# Patient Record
Sex: MALE | Race: WHITE | NOT HISPANIC OR LATINO | Employment: FULL TIME | URBAN - METROPOLITAN AREA
[De-identification: names, ages, dates, MRNs, and addresses within clinical notes are randomized per-mention and may not be internally consistent; named-entity substitution may affect disease eponyms.]

---

## 2017-02-16 ENCOUNTER — TELEPHONE (OUTPATIENT)
Dept: ONCOLOGY | Facility: CLINIC | Age: 42
End: 2017-02-16

## 2017-02-16 NOTE — TELEPHONE ENCOUNTER
Oral Chemotherapy Monitoring Program    Primary Oncologist: Dr. Cho  Primary Oncology Clinic: [facility]  Cancer Diagnosis: GIST     Therapy History:  Start Date: October 2015     Drug Interaction Assessment: No new drug interactions    Lab Monitoring Plan  Monitoring plan:   C1D1+  CMP, Mg, Phos, CBC C2D1+ Call, CMP, Mg, Phos, CBC C3D1+ Call, CMP, Mg, Phos, CBC C4D1+ Call, CMP, Mg, Phos, CBC C5D1+ Call, CMP, Mg, Phos, CBC C6D1+ Call, CMP, Mg, Phos, CBC   C1D8+ CBC C2D8+   C3D8+   C4D8+   C5D8+   C6D8+     C1D15+ Call, CBC C2D15+ CBC C3D15+   C4D15+   C5D15+   C6D15+     C1D22+ CBC C2D22+   C3D22+   C4D22+   C5D22+   C6D22+         Subjective/Objective:  Devin Patton is a 41 year old male contacted by phone for a follow-up visit for oral chemotherapy. He reports everything has been going well and denies nausea, vomiting, constipation, diarrhea, rash, fatigue, muscle aches, and recent fever/illness. No recent medication changes, missed doses, or questions. MPR = 0.9, 11 fills in 12 months, patient is adherent. Last delivered 30 day supply on 2/10.     ORAL CHEMOTHERAPY 11/13/2015 12/28/2015 1/20/2016 3/11/2016 4/12/2016 10/14/2016 2/16/2017   Drug Name Gleevec (Imatinib) Gleevec (Imatinib) Gleevec (Imatinib) Gleevec (Imatinib) Gleevec (Imatinib) Gleevec (Imatinib) Gleevec (Imatinib)   Current Dosage 400mg 400mg 400mg 400mg 400mg 400mg 400mg   Current Schedule Daily Daily Daily Daily Daily Daily Daily   Cycle Details Continuous Continuous Continuous Continuous Continuous Continuous Continuous   Start Date of Last Cycle 10/18/2015 - - - - - -   Doses missed in last 2 weeks 2 0 0 0 0 0 0   Adherence Assessment - - - - - - Adherent   Adverse Effects - Other (see comments) Other (see comments) Diarrhea None Diarrhea;Fatigue No AE identified during assessment   Diarrhea - - - 1-2/day - Grade 1 -   Pharmacist Intervention(diarrhea) - - - - - No -   Fatigue - - - - - Grade 1 -   Pharmacist  "Intervention(fatigue) - - - - - No -   Home BPs not needed not needed not needed not needed not needed not needed not needed   Any new drug interactions? - - - - - - No   Is the dose as ordered appropriate for the patient? - - - - - - Yes   Has the patient missed any days of school, work, or other routine activity? - - - - - - No       Last PHQ-2 Score on record:   PHQ-2 ( 1999 Pfizer) 1/25/2016 10/30/2015   Q1: Little interest or pleasure in doing things 0 2   Q2: Feeling down, depressed or hopeless 0 2   PHQ-2 Score 0 4       Patient does not report depression symptoms.      Vitals:  BP:   BP Readings from Last 1 Encounters:   10/27/16 129/77     Wt Readings from Last 1 Encounters:   10/27/16 90.7 kg (199 lb 14.4 oz)     Estimated body surface area is 2.14 meters squared as calculated from the following:    Height as of 10/27/16: 1.825 m (5' 11.85\").    Weight as of 10/27/16: 90.7 kg (199 lb 14.4 oz).    Labs:  Lab Results   Component Value Date     10/14/2016      Lab Results   Component Value Date    POTASSIUM 4.5 10/14/2016     Lab Results   Component Value Date    NADEEM 8.6 10/14/2016     Lab Results   Component Value Date    ALBUMIN 3.6 10/14/2016     Lab Results   Component Value Date    MAG 2.1 10/14/2016     Lab Results   Component Value Date    PHOS 2.0 10/14/2016     Lab Results   Component Value Date    BUN 14 10/14/2016     Lab Results   Component Value Date    CR 0.86 10/14/2016       Lab Results   Component Value Date    AST 17 10/14/2016     Lab Results   Component Value Date    ALT 27 10/14/2016     Lab Results   Component Value Date    BILITOTAL 0.4 10/14/2016       Lab Results   Component Value Date    WBC 6.1 10/14/2016     Lab Results   Component Value Date    HGB 15.4 10/14/2016     Lab Results   Component Value Date     10/14/2016     01/04/2016     Lab Results   Component Value Date    ANEU 3.7 10/14/2016           Assessment:  Patient is tolerating the medication well and " is satisfied with therapy. No pharmacological interventions needed at this time.     Plan:  Continue Gleevec 400 mg once daily.  Follow-Up:  Follow up in 3 months for side effect management.  Dr. Cho appointment, labs, radiology - 5/1/17    Refill Due:  Next rx released 2/20/17    Abebe Miller, PharmD  Therapy Management Oncology Pharmacist  [facility]   Phone: [phone]

## 2017-02-20 ENCOUNTER — TELEPHONE (OUTPATIENT)
Dept: ONCOLOGY | Facility: CLINIC | Age: 42
End: 2017-02-20

## 2017-02-20 DIAGNOSIS — R50.9 FEVER, UNSPECIFIED: Primary | ICD-10-CM

## 2017-02-20 NOTE — TELEPHONE ENCOUNTER
Chucho called in with a temp of 100.6, a sore throat, and slight congestion. He is slightly short of breath. No other symptoms.  Writer spoke with Dr. Cho and advised he take APAP and have labs checked tomorrow (CBC order entered).  Chucho will seek care of develops green sputum or increased shortness of breath. He will get labs drawn at [facility] tomorrow.    Lisa Hickey RN

## 2017-02-21 ENCOUNTER — TELEPHONE (OUTPATIENT)
Dept: ONCOLOGY | Facility: CLINIC | Age: 42
End: 2017-02-21

## 2017-02-21 ENCOUNTER — HOSPITAL ENCOUNTER (OUTPATIENT)
Dept: LAB | Facility: CLINIC | Age: 42
Discharge: HOME | End: 2017-02-21
Attending: INTERNAL MEDICINE | Admitting: INTERNAL MEDICINE
Payer: COMMERCIAL

## 2017-02-21 DIAGNOSIS — E83.39 HYPOPHOSPHATEMIA: Primary | ICD-10-CM

## 2017-02-21 DIAGNOSIS — Z79.899 ENCOUNTER FOR LONG-TERM (CURRENT) USE OF MEDICATIONS: ICD-10-CM

## 2017-02-21 LAB
ALBUMIN SERPL-MCNC: 3.6 G/DL (ref 3.4–5)
ALP SERPL-CCNC: 80 U/L (ref 40–150)
ALT SERPL W P-5'-P-CCNC: 29 U/L (ref 0–70)
ANION GAP SERPL CALCULATED.3IONS-SCNC: 4 MMOL/L (ref 3–14)
AST SERPL W P-5'-P-CCNC: 29 U/L (ref 0–45)
BASOPHILS # BLD AUTO: 0 10E9/L (ref 0–0.2)
BASOPHILS NFR BLD AUTO: 0.3 %
BILIRUB SERPL-MCNC: 0.4 MG/DL (ref 0.2–1.3)
BUN SERPL-MCNC: 14 MG/DL (ref 7–30)
CALCIUM SERPL-MCNC: 8.1 MG/DL (ref 8.5–10.1)
CHLORIDE SERPL-SCNC: 109 MMOL/L (ref 94–109)
CO2 SERPL-SCNC: 27 MMOL/L (ref 20–32)
CREAT SERPL-MCNC: 0.92 MG/DL (ref 0.66–1.25)
DIFFERENTIAL METHOD BLD: ABNORMAL
EOSINOPHIL # BLD AUTO: 0 10E9/L (ref 0–0.7)
EOSINOPHIL NFR BLD AUTO: 1.1 %
ERYTHROCYTE [DISTWIDTH] IN BLOOD BY AUTOMATED COUNT: 13.9 % (ref 10–15)
GFR SERPL CREATININE-BSD FRML MDRD: ABNORMAL ML/MIN/1.7M2
GLUCOSE SERPL-MCNC: 103 MG/DL (ref 70–99)
HCT VFR BLD AUTO: 44.6 % (ref 40–53)
HGB BLD-MCNC: 15.5 G/DL (ref 13.3–17.7)
IMM GRANULOCYTES # BLD: 0 10E9/L (ref 0–0.4)
IMM GRANULOCYTES NFR BLD: 0 %
LYMPHOCYTES # BLD AUTO: 1.2 10E9/L (ref 0.8–5.3)
LYMPHOCYTES NFR BLD AUTO: 34.5 %
MAGNESIUM SERPL-MCNC: 2.1 MG/DL (ref 1.6–2.3)
MCH RBC QN AUTO: 32.2 PG (ref 26.5–33)
MCHC RBC AUTO-ENTMCNC: 34.8 G/DL (ref 31.5–36.5)
MCV RBC AUTO: 93 FL (ref 78–100)
MONOCYTES # BLD AUTO: 0.5 10E9/L (ref 0–1.3)
MONOCYTES NFR BLD AUTO: 14.9 %
NEUTROPHILS # BLD AUTO: 1.7 10E9/L (ref 1.6–8.3)
NEUTROPHILS NFR BLD AUTO: 49.2 %
NRBC # BLD AUTO: 0 10*3/UL
NRBC BLD AUTO-RTO: 0 /100
PHOSPHATE SERPL-MCNC: 1.3 MG/DL (ref 2.5–4.5)
PLATELET # BLD AUTO: 149 10E9/L (ref 150–450)
POTASSIUM SERPL-SCNC: 4.5 MMOL/L (ref 3.4–5.3)
PROT SERPL-MCNC: 6.8 G/DL (ref 6.8–8.8)
RBC # BLD AUTO: 4.81 10E12/L (ref 4.4–5.9)
SODIUM SERPL-SCNC: 140 MMOL/L (ref 133–144)
WBC # BLD AUTO: 3.5 10E9/L (ref 4–11)

## 2017-02-21 PROCEDURE — 84100 ASSAY OF PHOSPHORUS: CPT | Performed by: INTERNAL MEDICINE

## 2017-02-21 PROCEDURE — 83735 ASSAY OF MAGNESIUM: CPT | Performed by: INTERNAL MEDICINE

## 2017-02-21 PROCEDURE — 36415 COLL VENOUS BLD VENIPUNCTURE: CPT | Performed by: INTERNAL MEDICINE

## 2017-02-21 PROCEDURE — 80053 COMPREHEN METABOLIC PANEL: CPT | Performed by: INTERNAL MEDICINE

## 2017-02-21 PROCEDURE — 85025 COMPLETE CBC W/AUTO DIFF WBC: CPT | Performed by: INTERNAL MEDICINE

## 2017-02-21 NOTE — TELEPHONE ENCOUNTER
Pt called for his lab results this morning. He was quite concerned about his low phosphorus and his WBC of 3.5. I let him know his ANC is WNL. Pt on Gleevec. His labs released to [facility]. Per Betzy pt to start on neutra phos 500 mg three times daily and have labs rechecked in 2 weeks.  Pt voices good understanding. Med sent to his preferred provider.

## 2017-02-28 DIAGNOSIS — C49.A2 MALIGNANT GASTROINTESTINAL STROMAL TUMOR (GIST) OF STOMACH (H): Primary | ICD-10-CM

## 2017-02-28 RX ORDER — IMATINIB MESYLATE 400 MG/1
400 TABLET, FILM COATED ORAL DAILY
Qty: 30 TABLET | Refills: 2 | Status: SHIPPED | OUTPATIENT
Start: 2017-02-28 | End: 2017-05-01

## 2017-03-01 ENCOUNTER — TELEPHONE (OUTPATIENT)
Dept: ONCOLOGY | Facility: CLINIC | Age: 42
End: 2017-03-01

## 2017-03-01 NOTE — TELEPHONE ENCOUNTER
Oral Chemotherapy Monitoring Program    Primary Oncologist: Dr. Cho  Primary Oncology Clinic: [facility]  Cancer Diagnosis: GIST      Therapy History: Confirmed patient takes Gleevec 400 mg once daily, continuous   Start Date: October 2015      Drug Interaction Assessment: No new drug interactions    Lab Monitoring Plan  Monitoring plan:   C1D1+  CMP, Mg, Phos, CBC C2D1+ Call, CMP, Mg, Phos, CBC C3D1+ Call, CMP, Mg, Phos, CBC C4D1+ Call, CMP, Mg, Phos, CBC C5D1+ Call, CMP, Mg, Phos, CBC C6D1+ Call, CMP, Mg, Phos, CBC   C1D8+ CBC C2D8+    C3D8+    C4D8+    C5D8+    C6D8+      C1D15+ Call, CBC C2D15+ CBC C3D15+    C4D15+    C5D15+    C6D15+      C1D22+ CBC C2D22+    C3D22+    C4D22+    C5D22+    C6D22+           Subjective/Objective:  Devin Patton is a 41 year old male contacted by phone for a follow-up visit for oral chemotherapy.  Chucho reports he had a fever of 101F on 2/20 that broke on 2/21. His labs on 2/21/17 had lower WBC and phosphorus than on 10/14/16. WBC went from 6.1 to 3.5 and phosphorus went from 2 to 1.3. He will have follow-up labs next week. Started neutra phos 500 mg three times daily. He denies other side effects. Delivery set for 3/3.     ORAL CHEMOTHERAPY 12/28/2015 1/20/2016 3/11/2016 4/12/2016 10/14/2016 2/16/2017 3/1/2017   Drug Name Gleevec (Imatinib) Gleevec (Imatinib) Gleevec (Imatinib) Gleevec (Imatinib) Gleevec (Imatinib) Gleevec (Imatinib) Gleevec (Imatinib)   Current Dosage 400mg 400mg 400mg 400mg 400mg 400mg 400mg   Current Schedule Daily Daily Daily Daily Daily Daily Daily   Cycle Details Continuous Continuous Continuous Continuous Continuous Continuous Continuous   Start Date of Last Cycle - - - - - - -   Doses missed in last 2 weeks 0 0 0 0 0 0 0   Adherence Assessment - - - - - Adherent Adherent   Adverse Effects Other (see comments) Other (see comments) Diarrhea None Diarrhea;Fatigue No AE identified during assessment No AE identified during assessment   Diarrhea  "- - 1-2/day - Grade 1 - -   Pharmacist Intervention(diarrhea) - - - - No - -   Fatigue - - - - Grade 1 - -   Pharmacist Intervention(fatigue) - - - - No - -   Home BPs not needed not needed not needed not needed not needed not needed not needed   Any new drug interactions? - - - - - No No   Is the dose as ordered appropriate for the patient? - - - - - Yes Yes   Is the patient currently in pain? - - - - - - No   Has the patient missed any days of school, work, or other routine activity? - - - - - No No       Last PHQ-2 Score on record:   PHQ-2 ( 1999 Pfizer) 1/25/2016 10/30/2015   Q1: Little interest or pleasure in doing things 0 2   Q2: Feeling down, depressed or hopeless 0 2   PHQ-2 Score 0 4       Patient does not report depression symptoms.      Vitals:  BP:   BP Readings from Last 1 Encounters:   10/27/16 129/77     Wt Readings from Last 1 Encounters:   10/27/16 90.7 kg (199 lb 14.4 oz)     Estimated body surface area is 2.14 meters squared as calculated from the following:    Height as of 10/27/16: 1.825 m (5' 11.85\").    Weight as of 10/27/16: 90.7 kg (199 lb 14.4 oz).    Labs:  Lab Results   Component Value Date     02/21/2017      Lab Results   Component Value Date    POTASSIUM 4.5 02/21/2017     Lab Results   Component Value Date    NADEEM 8.1 02/21/2017     Lab Results   Component Value Date    ALBUMIN 3.6 02/21/2017     Lab Results   Component Value Date    MAG 2.1 02/21/2017     Lab Results   Component Value Date    PHOS 1.3 02/21/2017     Lab Results   Component Value Date    BUN 14 02/21/2017     Lab Results   Component Value Date    CR 0.92 02/21/2017       Lab Results   Component Value Date    AST 29 02/21/2017     Lab Results   Component Value Date    ALT 29 02/21/2017     Lab Results   Component Value Date    BILITOTAL 0.4 02/21/2017       Lab Results   Component Value Date    WBC 3.5 02/21/2017     Lab Results   Component Value Date    HGB 15.5 02/21/2017     Lab Results   Component Value Date "     02/21/2017     01/04/2016     Lab Results   Component Value Date    ANEU 1.7 02/21/2017       Labs   10/14/16  2/21/17  WBC   6.1   3.5  Phosphorus  2   1.3        Assessment:  Patient is tolerating the medication. Due to drop in WBC and phosphorus, will recheck labs next week. Started neutra phos 500mg three times daily on 2/21. No other pharmacological interventions at this time.      Plan:  Continue Gleevec 400 mg once daily.  Follow-Up:  Labs week of 3/6  Follow up in 3 months for side effect management.  Dr. Cho appointment, labs, radiology - 5/1/17    Refill Due:  Rx will be delivered 3/3/17  Next refill due in 4 weeks  Next Rx released 5/22/17    Abebe Miller, PharmD  Therapy Management Oncology Pharmacist  [facility]   Phone: [phone]

## 2017-03-08 ENCOUNTER — HOSPITAL ENCOUNTER (OUTPATIENT)
Dept: LAB | Facility: CLINIC | Age: 42
Discharge: HOME | End: 2017-03-08
Attending: INTERNAL MEDICINE | Admitting: INTERNAL MEDICINE
Payer: COMMERCIAL

## 2017-03-08 DIAGNOSIS — C49.9 SARCOMA (H): ICD-10-CM

## 2017-03-08 DIAGNOSIS — R50.9 FEVER, UNSPECIFIED: ICD-10-CM

## 2017-03-08 LAB
BASOPHILS # BLD AUTO: 0 10E9/L (ref 0–0.2)
BASOPHILS NFR BLD AUTO: 0.2 %
DIFFERENTIAL METHOD BLD: NORMAL
EOSINOPHIL # BLD AUTO: 0.1 10E9/L (ref 0–0.7)
EOSINOPHIL NFR BLD AUTO: 1.5 %
ERYTHROCYTE [DISTWIDTH] IN BLOOD BY AUTOMATED COUNT: 14.2 % (ref 10–15)
HCT VFR BLD AUTO: 43.2 % (ref 40–53)
HGB BLD-MCNC: 15 G/DL (ref 13.3–17.7)
IMM GRANULOCYTES # BLD: 0 10E9/L (ref 0–0.4)
IMM GRANULOCYTES NFR BLD: 0.3 %
LYMPHOCYTES # BLD AUTO: 1.5 10E9/L (ref 0.8–5.3)
LYMPHOCYTES NFR BLD AUTO: 24.5 %
MCH RBC QN AUTO: 32.6 PG (ref 26.5–33)
MCHC RBC AUTO-ENTMCNC: 34.7 G/DL (ref 31.5–36.5)
MCV RBC AUTO: 94 FL (ref 78–100)
MONOCYTES # BLD AUTO: 0.3 10E9/L (ref 0–1.3)
MONOCYTES NFR BLD AUTO: 4.4 %
NEUTROPHILS # BLD AUTO: 4.3 10E9/L (ref 1.6–8.3)
NEUTROPHILS NFR BLD AUTO: 69.1 %
NRBC # BLD AUTO: 0 10*3/UL
NRBC BLD AUTO-RTO: 0 /100
PHOSPHATE SERPL-MCNC: 1.9 MG/DL (ref 2.5–4.5)
PLATELET # BLD AUTO: 194 10E9/L (ref 150–450)
RBC # BLD AUTO: 4.6 10E12/L (ref 4.4–5.9)
WBC # BLD AUTO: 6.2 10E9/L (ref 4–11)

## 2017-03-08 PROCEDURE — 85025 COMPLETE CBC W/AUTO DIFF WBC: CPT | Performed by: INTERNAL MEDICINE

## 2017-03-08 PROCEDURE — 36415 COLL VENOUS BLD VENIPUNCTURE: CPT | Performed by: INTERNAL MEDICINE

## 2017-03-08 PROCEDURE — 84100 ASSAY OF PHOSPHORUS: CPT | Performed by: INTERNAL MEDICINE

## 2017-03-24 ENCOUNTER — TELEPHONE (OUTPATIENT)
Dept: ONCOLOGY | Facility: CLINIC | Age: 42
End: 2017-03-24

## 2017-03-24 NOTE — TELEPHONE ENCOUNTER
Oral Chemotherapy Monitoring Program    Primary Oncologist: Dr. Cho  Primary Oncology Clinic: [facility]  Cancer Diagnosis: GIST      Therapy History: Confirmed patient takes Gleevec 400 mg once daily, continuous   Start Date: October 2015      Drug Interaction Assessment: No new drug interactions     Lab Monitoring Plan                Monitoring plan:   C1D1+  CMP, Mg, Phos, CBC C2D1+ Call, CMP, Mg, Phos, CBC C3D1+ Call, CMP, Mg, Phos, CBC C4D1+ Call, CMP, Mg, Phos, CBC C5D1+ Call, CMP, Mg, Phos, CBC C6D1+ Call, CMP, Mg, Phos, CBC   C1D8+ CBC C2D8+    C3D8+    C4D8+    C5D8+    C6D8+      C1D15+ Call, CBC C2D15+ CBC C3D15+    C4D15+    C5D15+    C6D15+      C1D22+ CBC C2D22+    C3D22+    C4D22+    C5D22+    C6D22+             Subjective/Objective:  Devin Patton is a 41 year old male contacted by phone for a follow-up visit for oral chemotherapy.  Chucho reports he has been tolerating Gleevec well with no side effects. He was glad his WBC returned to WNL and his phos level increased. He does not have any questions or concerns today and appreciated the phone call.     ORAL CHEMOTHERAPY 1/20/2016 3/11/2016 4/12/2016 10/14/2016 2/16/2017 3/1/2017 3/24/2017   Drug Name Gleevec (Imatinib) Gleevec (Imatinib) Gleevec (Imatinib) Gleevec (Imatinib) Gleevec (Imatinib) Gleevec (Imatinib) Gleevec (Imatinib)   Current Dosage 400mg 400mg 400mg 400mg 400mg 400mg 400mg   Current Schedule Daily Daily Daily Daily Daily Daily Daily   Cycle Details Continuous Continuous Continuous Continuous Continuous Continuous Continuous   Start Date of Last Cycle - - - - - - -   Doses missed in last 2 weeks 0 0 0 0 0 0 0   Adherence Assessment - - - - Adherent Adherent Adherent   Adverse Effects Other (see comments) Diarrhea None Diarrhea;Fatigue No AE identified during assessment No AE identified during assessment No AE identified during assessment   Diarrhea - 1-2/day - Grade 1 - - -   Pharmacist Intervention(diarrhea) - - - No -  "- -   Fatigue - - - Grade 1 - - -   Pharmacist Intervention(fatigue) - - - No - - -   Home BPs not needed not needed not needed not needed not needed not needed not needed   Any new drug interactions? - - - - No No No   Is the dose as ordered appropriate for the patient? - - - - Yes Yes Yes   Is the patient currently in pain? - - - - - No No   Has the patient missed any days of school, work, or other routine activity? - - - - No No No       Last PHQ-2 Score on record:   PHQ-2 ( 1999 Pfizer) 1/25/2016 10/30/2015   Q1: Little interest or pleasure in doing things 0 2   Q2: Feeling down, depressed or hopeless 0 2   PHQ-2 Score 0 4       Patient does not report depression symptoms.      Vitals:  BP:   BP Readings from Last 1 Encounters:   10/27/16 129/77     Wt Readings from Last 1 Encounters:   10/27/16 90.7 kg (199 lb 14.4 oz)     Estimated body surface area is 2.14 meters squared as calculated from the following:    Height as of 10/27/16: 1.825 m (5' 11.85\").    Weight as of 10/27/16: 90.7 kg (199 lb 14.4 oz).    Labs:  Lab Results   Component Value Date     02/21/2017      Lab Results   Component Value Date    POTASSIUM 4.5 02/21/2017     Lab Results   Component Value Date    NADEEM 8.1 02/21/2017     Lab Results   Component Value Date    ALBUMIN 3.6 02/21/2017     Lab Results   Component Value Date    MAG 2.1 02/21/2017     Lab Results   Component Value Date    PHOS 1.9 03/08/2017     Lab Results   Component Value Date    BUN 14 02/21/2017     Lab Results   Component Value Date    CR 0.92 02/21/2017       Lab Results   Component Value Date    AST 29 02/21/2017     Lab Results   Component Value Date    ALT 29 02/21/2017     Lab Results   Component Value Date    BILITOTAL 0.4 02/21/2017       Lab Results   Component Value Date    WBC 6.2 03/08/2017     Lab Results   Component Value Date    HGB 15.0 03/08/2017     Lab Results   Component Value Date     03/08/2017     Lab Results   Component Value Date    ANEU " 4.3 03/08/2017           Assessment:    Patient is tolerating the medication. No pharmacological interventions at this time.        Plan:    Continue Gleevec 400 mg once daily.    Follow-Up:  Dr. Cho appointment, labs, radiology - 5/1/17     Refill Due:  Rx will be delivered 3/30/17  Next refill due in 4 weeks  Next Rx released 5/22/17

## 2017-04-26 ENCOUNTER — TELEPHONE (OUTPATIENT)
Dept: ONCOLOGY | Facility: CLINIC | Age: 42
End: 2017-04-26

## 2017-04-26 NOTE — ORAL ONC MGMT
Oral Chemotherapy Monitoring Program     Chucho called with question regarding potential risk in pregnancy of fathering a child while on Gleevec. It is recommended that highly effective contraception be used to avoid pregnancy while on Gleevec and for two weeks after last Gleevec dose. Risk of abnormalities are increased with paternal exposure. Chucho expressed understanding and thanked me for the information.     Shreyas WashburnD  [facility]  [phone]  April 26, 2017

## 2017-05-01 ENCOUNTER — ONCOLOGY VISIT (OUTPATIENT)
Dept: ONCOLOGY | Facility: CLINIC | Age: 42
End: 2017-05-01
Attending: INTERNAL MEDICINE
Payer: COMMERCIAL

## 2017-05-01 ENCOUNTER — APPOINTMENT (OUTPATIENT)
Dept: LAB | Facility: CLINIC | Age: 42
End: 2017-05-01
Attending: INTERNAL MEDICINE
Payer: COMMERCIAL

## 2017-05-01 VITALS
BODY MASS INDEX: 27.63 KG/M2 | SYSTOLIC BLOOD PRESSURE: 123 MMHG | WEIGHT: 204 LBS | OXYGEN SATURATION: 99 % | HEART RATE: 60 BPM | DIASTOLIC BLOOD PRESSURE: 52 MMHG | HEIGHT: 72 IN

## 2017-05-01 DIAGNOSIS — C49.A0 MALIGNANT GASTROINTESTINAL STROMAL TUMOR, UNSPECIFIED SITE (H): ICD-10-CM

## 2017-05-01 LAB
ALBUMIN SERPL-MCNC: 3.4 G/DL (ref 3.4–5)
ALP SERPL-CCNC: 58 U/L (ref 40–150)
ALT SERPL W P-5'-P-CCNC: 25 U/L (ref 0–70)
ANION GAP SERPL CALCULATED.3IONS-SCNC: 10 MMOL/L (ref 3–14)
AST SERPL W P-5'-P-CCNC: 22 U/L (ref 0–45)
BASOPHILS # BLD AUTO: 0 10E9/L (ref 0–0.2)
BASOPHILS NFR BLD AUTO: 0.4 %
BILIRUB SERPL-MCNC: 0.8 MG/DL (ref 0.2–1.3)
BUN SERPL-MCNC: 19 MG/DL (ref 7–30)
CALCIUM SERPL-MCNC: 7.3 MG/DL (ref 8.5–10.1)
CHLORIDE SERPL-SCNC: 112 MMOL/L (ref 94–109)
CO2 SERPL-SCNC: 23 MMOL/L (ref 20–32)
CREAT SERPL-MCNC: 0.76 MG/DL (ref 0.66–1.25)
DIFFERENTIAL METHOD BLD: NORMAL
EOSINOPHIL # BLD AUTO: 0.1 10E9/L (ref 0–0.7)
EOSINOPHIL NFR BLD AUTO: 1.5 %
ERYTHROCYTE [DISTWIDTH] IN BLOOD BY AUTOMATED COUNT: 13.5 % (ref 10–15)
GFR SERPL CREATININE-BSD FRML MDRD: ABNORMAL ML/MIN/1.7M2
GLUCOSE SERPL-MCNC: 90 MG/DL (ref 70–99)
HCT VFR BLD AUTO: 42.9 % (ref 40–53)
HGB BLD-MCNC: 14.8 G/DL (ref 13.3–17.7)
IMM GRANULOCYTES # BLD: 0 10E9/L (ref 0–0.4)
IMM GRANULOCYTES NFR BLD: 0 %
LYMPHOCYTES # BLD AUTO: 1.5 10E9/L (ref 0.8–5.3)
LYMPHOCYTES NFR BLD AUTO: 30.7 %
MCH RBC QN AUTO: 32.7 PG (ref 26.5–33)
MCHC RBC AUTO-ENTMCNC: 34.5 G/DL (ref 31.5–36.5)
MCV RBC AUTO: 95 FL (ref 78–100)
MONOCYTES # BLD AUTO: 0.2 10E9/L (ref 0–1.3)
MONOCYTES NFR BLD AUTO: 4.6 %
NEUTROPHILS # BLD AUTO: 3 10E9/L (ref 1.6–8.3)
NEUTROPHILS NFR BLD AUTO: 62.8 %
NRBC # BLD AUTO: 0 10*3/UL
NRBC BLD AUTO-RTO: 0 /100
PHOSPHATE SERPL-MCNC: 1.4 MG/DL (ref 2.5–4.5)
PLATELET # BLD AUTO: 175 10E9/L (ref 150–450)
POTASSIUM SERPL-SCNC: 3.6 MMOL/L (ref 3.4–5.3)
PROT SERPL-MCNC: 6.3 G/DL (ref 6.8–8.8)
RBC # BLD AUTO: 4.52 10E12/L (ref 4.4–5.9)
SODIUM SERPL-SCNC: 145 MMOL/L (ref 133–144)
TSH SERPL DL<=0.005 MIU/L-ACNC: 1.07 MU/L (ref 0.4–4)
WBC # BLD AUTO: 4.8 10E9/L (ref 4–11)

## 2017-05-01 PROCEDURE — 85025 COMPLETE CBC W/AUTO DIFF WBC: CPT | Performed by: NURSE PRACTITIONER

## 2017-05-01 PROCEDURE — 84443 ASSAY THYROID STIM HORMONE: CPT | Performed by: NURSE PRACTITIONER

## 2017-05-01 PROCEDURE — 99212 OFFICE O/P EST SF 10 MIN: CPT | Mod: ZF

## 2017-05-01 PROCEDURE — 84100 ASSAY OF PHOSPHORUS: CPT | Performed by: NURSE PRACTITIONER

## 2017-05-01 PROCEDURE — 80053 COMPREHEN METABOLIC PANEL: CPT | Performed by: NURSE PRACTITIONER

## 2017-05-01 PROCEDURE — 99214 OFFICE O/P EST MOD 30 MIN: CPT | Performed by: INTERNAL MEDICINE

## 2017-05-01 ASSESSMENT — PAIN SCALES - GENERAL: PAINLEVEL: NO PAIN (0)

## 2017-05-01 NOTE — Clinical Note
5/1/2017       RE: Devin Patton  [address]  [place] [address]     Dear Colleague,    Thank you for referring your patient, Devin Patton, to the [facility]. Please see a copy of my visit note below.     Dictation on: 05/01/2017  3:43 PM by: MUKESH SOMERS [[id number]]         HISTORY OF PRESENT ILLNESS:  Devin Patton is here today in followup of GIST.  He is about a year and a half out from resection of his low-risk tumor.  He is on adjuvant Gleevec and tolerating that quite well.  Since our last visit, he reports his appetite and energy are good.  He has had better control of his anxiety.  A complete review of systems is otherwise unremarkable.  He and his wife are in the process of building a new house and wanted to discuss today trying to get pregnant.      PHYSICAL EXAMINATION:   GENERAL:  Mr. Patton appears robustly healthy.   HEENT:  He has no icterus.   NECK:  He has no adenopathy palpable in the neck or supraclavicular spaces.  His thyroid is unremarkable.   LUNGS:  Clear to auscultation without dullness to percussion.   HEART:  Rate and rhythm are regular without audible murmur or gallop.   ABDOMEN:  Soft and nontender, without palpable mass or organomegaly.   EXTREMITIES:  He has no peripheral edema.  There is no cyanosis or clubbing of his digits.   NEUROLOGIC:  Speech is fluent and cranial nerves are grossly intact.      RESULTS:  I reviewed with the patient and his wife his lab work and CT scan which shows normal electrolytes and renal function, normal liver enzymes, nearly normal blood counts with a marginally low white count.  His CT scan shows no evidence of recurrence of disease.      ASSESSMENT:  Resected gastrointestinal stromal tumor.  He has very low risk of recurrence.  The plan is to finish out another year and a half of adjuvant treatment.  We talked about their plans to get pregnant.  I do not view his taking Gleevec to have any risk to the  quality of his sperm, though likely there could be some risk from his wife's exposure once she was pregnant, although that risk has got to be extremely small.  If they are worried about it I just recommended using condoms when they are having sex after she is pregnant.  If he is really worried about it, he can discontinue his Gleevec.  His risk of recurrence is only about 10% and the difference between no Gleevec at all and the Gleevec is probably only a few percent, so a difference of missing a few months of Gleevec in the course of this is very small.  We will plan on seeing him back in about 6 months with another CT scan and lab work.      Again, thank you for allowing me to participate in the care of your patient.      Sincerely,    Benjamin Cho MD

## 2017-05-01 NOTE — PROGRESS NOTES
HISTORY OF PRESENT ILLNESS:  Devin Patton is here today in followup of GIST.  He is about a year and a half out from resection of his low-risk tumor.  He is on adjuvant Gleevec and tolerating that quite well.  Since our last visit, he reports his appetite and energy are good.  He has had better control of his anxiety.  A complete review of systems is otherwise unremarkable.  He and his wife are in the process of building a new house and wanted to discuss today trying to get pregnant.      PHYSICAL EXAMINATION:   GENERAL:  Mr. Patton appears robustly healthy.   HEENT:  He has no icterus.   NECK:  He has no adenopathy palpable in the neck or supraclavicular spaces.  His thyroid is unremarkable.   LUNGS:  Clear to auscultation without dullness to percussion.   HEART:  Rate and rhythm are regular without audible murmur or gallop.   ABDOMEN:  Soft and nontender, without palpable mass or organomegaly.   EXTREMITIES:  He has no peripheral edema.  There is no cyanosis or clubbing of his digits.   NEUROLOGIC:  Speech is fluent and cranial nerves are grossly intact.      RESULTS:  I reviewed with the patient and his wife his lab work and CT scan which shows normal electrolytes and renal function, normal liver enzymes, nearly normal blood counts with a marginally low white count.  His CT scan shows no evidence of recurrence of disease.      ASSESSMENT:  Resected gastrointestinal stromal tumor.  He has very low risk of recurrence.  The plan is to finish out another year and a half of adjuvant treatment.  We talked about their plans to get pregnant.  I do not view his taking Gleevec to have any risk to the DNA of his sperm. Though there could be some risk from his wife's exposure once she was pregnant, that risk has got to be extremely small.  If they are worried about it I just recommended using condoms when they are having sex after she is pregnant.  If he is really worried about it, he can discontinue his Gleevec.  His risk  of recurrence is only about 10% and the difference between no Gleevec at all and the Gleevec is probably only a few percent, so a difference of missing a few months of Gleevec in the course of this is very small.  We will plan on seeing him back in about 6 months with another CT scan and lab work.

## 2017-05-01 NOTE — NURSING NOTE
"Oncology Rooming Note    May 1, 2017 3:03 PM   Devin Patton is a 41 year old male who presents for: Blood Draw and Oncology Clinic Visit    Initial Vitals: /52  Pulse 60  Ht 1.825 m (5' 11.85\")  Wt 92.5 kg (204 lb)  SpO2 99%  BMI 27.78 kg/m2 Estimated body mass index is 27.78 kg/(m^2) as calculated from the following:    Height as of this encounter: 1.825 m (5' 11.85\").    Weight as of this encounter: 92.5 kg (204 lb). Body surface area is 2.17 meters squared.  No Pain (0) Comment: Data Unavailable   No LMP for male patient.  Allergies reviewed: Yes  Medications reviewed: Yes    Medications: Medication refills not needed today.  Pharmacy name entered into [facility]:    [id number] - [place], [place] - [address] AT [facility] OF  & [address]  [id number] - [place], [place] - [address]  [id number] - [place], [place] - [address] [facility] OF [facility] & [address]  [facility] - [place], [place] - [address]    Clinical concerns:No new concerns Provider was notified.    7 minutes for nursing intake (face to face time)     Nevin Castaneda MA                "

## 2017-05-01 NOTE — NURSING NOTE
Chief Complaint   Patient presents with     Blood Draw     Please check Temp. Vitals charted,labs collected and advised to checked into next appt.

## 2017-05-01 NOTE — MR AVS SNAPSHOT
"              After Visit Summary   5/1/2017    Devin Patton    MRN: [id number]           Patient Information     Date Of Birth          1975        Visit Information        Provider Department      5/1/2017 3:00 PM Benjamin Cho MD [facility]        Today's Diagnoses     Malignant gastrointestinal stromal tumor, unspecified site           Follow-ups after your visit        Follow-up notes from your care team     Return in about 6 months (around 11/1/2017) for NP Visit with CT and labs.      Who to contact     If you have questions or need follow up information about today's clinic visit or your schedule please contact [facility] directly at [phone].  Normal or non-critical lab and imaging results will be communicated to you by MyChart, letter or phone within 4 business days after the clinic has received the results. If you do not hear from us within 7 days, please contact the clinic through [employer] or phone. If you have a critical or abnormal lab result, we will notify you by phone as soon as possible.  Submit refill requests through [employer] or call your pharmacy and they will forward the refill request to us. Please allow 3 business days for your refill to be completed.          Additional Information About Your Visit        MyChart Information     [employer] gives you secure access to your electronic health record. If you see a primary care provider, you can also send messages to your care team and make appointments. If you have questions, please call your primary care clinic.  If you do not have a primary care provider, please call [phone] and they will assist you.        Care EveryWhere ID     This is your Care EveryWhere ID. This could be used by other organizations to access your [place] medical records  [id number]        Your Vitals Were     Pulse Height Pulse Oximetry BMI (Body Mass Index)          60 1.825 m (5' 11.85\") 99% 27.78 kg/m2   "       Blood Pressure from Last 3 Encounters:   05/01/17 123/52   10/27/16 129/77   05/21/16 126/62    Weight from Last 3 Encounters:   05/01/17 92.5 kg (204 lb)   10/27/16 90.7 kg (199 lb 14.4 oz)   05/21/16 96.6 kg (213 lb)              We Performed the Following     CBC with platelets differential     Comprehensive metabolic panel     Phosphorus     TSH with free T4 reflex          Today's Medication Changes          These changes are accurate as of: 5/1/17 11:59 PM.  If you have any questions, ask your nurse or doctor.               These medicines have changed or have updated prescriptions.        Dose/Directions    imatinib 400 MG tablet   Commonly known as:  GLEEVEC   This may have changed:  Another medication with the same name was removed. Continue taking this medication, and follow the directions you see here.   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   Quantity:  30 tablet   Refills:  2                Primary Care Provider    Physician No Ref-Primary       No address on file        Thank you!     Thank you for choosing [facility]  for your care. Our goal is always to provide you with excellent care. Hearing back from our patients is one way we can continue to improve our services. Please take a few minutes to complete the written survey that you may receive in the mail after your visit with us. Thank you!             Your Updated Medication List - Protect others around you: Learn how to safely use, store and throw away your medicines at www.disposemymeds.org.          This list is accurate as of: 5/1/17 11:59 PM.  Always use your most recent med list.                   Brand Name Dispense Instructions for use    imatinib 400 MG tablet    GLEEVEC    30 tablet    Take 1 tablet (400 mg) by mouth daily Take with a meal  and a large glass of water.       phosphorus tablet 250 mg 250 MG per tablet    K PHOS NEUTRAL    84 tablet    Take 2 tablets (500 mg) by mouth 3 times daily

## 2017-05-04 ENCOUNTER — TELEPHONE (OUTPATIENT)
Dept: ONCOLOGY | Facility: CLINIC | Age: 42
End: 2017-05-04

## 2017-05-04 NOTE — TELEPHONE ENCOUNTER
ONCOLOGY SOCIAL WORK  D) Chucho is a 41-yr-old gentleman with GIST/post surgery and on Gleevec  I) Distress Screening Tool  A/P)  P/c to pt, introduction of oncology social work  He is feeling good about things right now after his appt with Dr Cho (discussed depression on screening) and is looking forward to the future.  Grateful the surgery was successful.  No needs identified.  No further follow-up    Sarah Card, LICSW  [phone]

## 2017-05-19 DIAGNOSIS — C49.A2 MALIGNANT GASTROINTESTINAL STROMAL TUMOR (GIST) OF STOMACH (H): Primary | ICD-10-CM

## 2017-05-19 RX ORDER — IMATINIB MESYLATE 400 MG/1
400 TABLET, FILM COATED ORAL DAILY
Qty: 30 TABLET | Refills: 2 | Status: SHIPPED | OUTPATIENT
Start: 2017-05-19 | End: 2017-11-06

## 2017-06-23 ENCOUNTER — TELEPHONE (OUTPATIENT)
Dept: ONCOLOGY | Facility: CLINIC | Age: 42
End: 2017-06-23

## 2017-06-23 NOTE — TELEPHONE ENCOUNTER
Oral Chemotherapy Monitoring Program    Primary Oncologist: Dr. Cho  Primary Oncology Clinic: [facility]  Cancer Diagnosis: GIST      Therapy History: Patient confirmed Gleevec on HOLD for six months starting 5/1/2017 for family planning  Start Date: October 2015 to May 2017- Gleevec 400mg once daily        Drug Interaction Assessment: No new drug interactions      Lab Monitoring Plan                            Monitoring plan:   C1D1+  CMP, Mg, Phos, CBC C2D1+ Call, CMP, Mg, Phos, CBC C3D1+ Call, CMP, Mg, Phos, CBC C4D1+ Call, CMP, Mg, Phos, CBC C5D1+ Call, CMP, Mg, Phos, CBC C6D1+ Call, CMP, Mg, Phos, CBC   C1D8+ CBC C2D8+    C3D8+    C4D8+    C5D8+    C6D8+      C1D15+ Call, CBC C2D15+ CBC C3D15+    C4D15+    C5D15+    C6D15+      C1D22+ CBC C2D22+    C3D22+    C4D22+    C5D22+    C6D22+            Subjective/Objective:  Devin Patton is a 41 year old male contacted by phone for a follow-up visit for oral chemotherapy.  Chucho reports after discussion with Dr. Cho and [facility] oncologist, Gleevec was put on hold as of 5/1/2017 as he and his wife desire to start a family. He did not have any questions and appreciated the follow up phone call. MPR=0.94, adherent over past 12 months while on Gleevec.     ORAL CHEMOTHERAPY 3/11/2016 4/12/2016 10/14/2016 2/16/2017 3/1/2017 3/24/2017 6/23/2017   Drug Name Gleevec (Imatinib) Gleevec (Imatinib) Gleevec (Imatinib) Gleevec (Imatinib) Gleevec (Imatinib) Gleevec (Imatinib) Gleevec (Imatinib)   Current Dosage 400mg 400mg 400mg 400mg 400mg 400mg 400mg   Current Schedule Daily Daily Daily Daily Daily Daily Daily   Cycle Details Continuous Continuous Continuous Continuous Continuous Continuous Drug on Hold   Start Date of Last Cycle - - - - - - -   Doses missed in last 2 weeks 0 0 0 0 0 0 -   Adherence Assessment - - - Adherent Adherent Adherent -   Adverse Effects Diarrhea None Diarrhea;Fatigue No AE identified during assessment No AE identified during assessment  "No AE identified during assessment No AE identified during assessment   Diarrhea 1-2/day - Grade 1 - - - -   Pharmacist Intervention(diarrhea) - - No - - - -   Fatigue - - Grade 1 - - - -   Pharmacist Intervention(fatigue) - - No - - - -   Home BPs not needed not needed not needed not needed not needed not needed not needed   Any new drug interactions? - - - No No No -   Is the dose as ordered appropriate for the patient? - - - Yes Yes Yes -   Is the patient currently in pain? - - - - No No No   Has the patient missed any days of school, work, or other routine activity? - - - No No No No       Last PHQ-2 Score on record:   PHQ-2 ( 1999 [employer]) 1/25/2016 10/30/2015   Q1: Little interest or pleasure in doing things 0 2   Q2: Feeling down, depressed or hopeless 0 2   PHQ-2 Score 0 4       Patient does not report depression symptoms.      Vitals:  BP:   BP Readings from Last 1 Encounters:   05/01/17 123/52     Wt Readings from Last 1 Encounters:   05/01/17 92.5 kg (204 lb)     Estimated body surface area is 2.17 meters squared as calculated from the following:    Height as of 5/1/17: 1.825 m (5' 11.85\").    Weight as of 5/1/17: 92.5 kg (204 lb).    Labs:  Lab Results   Component Value Date     05/01/2017      Lab Results   Component Value Date    POTASSIUM 3.6 05/01/2017     Lab Results   Component Value Date    NADEEM 7.3 05/01/2017     Lab Results   Component Value Date    ALBUMIN 3.4 05/01/2017     Lab Results   Component Value Date    MAG 2.1 02/21/2017     Lab Results   Component Value Date    PHOS 1.4 05/01/2017     Lab Results   Component Value Date    BUN 19 05/01/2017     Lab Results   Component Value Date    CR 0.76 05/01/2017       Lab Results   Component Value Date    AST 22 05/01/2017     Lab Results   Component Value Date    ALT 25 05/01/2017     Lab Results   Component Value Date    BILITOTAL 0.8 05/01/2017       Lab Results   Component Value Date    WBC 4.8 05/01/2017     Lab Results   Component Value " Date    HGB 14.8 05/01/2017     Lab Results   Component Value Date     05/01/2017     Lab Results   Component Value Date    ANEU 3.0 05/01/2017           Assessment:  Chucho and his wife are planning to start a family and after discussion with oncologist, will hold Gleevec for 6 months.     Plan:  Continue Gleevec hold for six months.    Follow-Up:  Clinic appointment 11/6/2017.    Refill Due:  None at this time, consider following 11/6/17 appointment.     Thank you for the opportunity to participate in this patient's care.     Abebe Miller, PharmD  Therapy Management Oncology Pharmacist  [facility]   Phone: [phone]

## 2017-11-06 ENCOUNTER — RADIANT APPOINTMENT (OUTPATIENT)
Dept: CT IMAGING | Facility: CLINIC | Age: 42
End: 2017-11-06
Attending: INTERNAL MEDICINE
Payer: COMMERCIAL

## 2017-11-06 ENCOUNTER — ONCOLOGY VISIT (OUTPATIENT)
Dept: ONCOLOGY | Facility: CLINIC | Age: 42
End: 2017-11-06
Attending: NURSE PRACTITIONER
Payer: COMMERCIAL

## 2017-11-06 VITALS
HEART RATE: 61 BPM | TEMPERATURE: 98.9 F | OXYGEN SATURATION: 97 % | RESPIRATION RATE: 16 BRPM | SYSTOLIC BLOOD PRESSURE: 136 MMHG | DIASTOLIC BLOOD PRESSURE: 73 MMHG | WEIGHT: 211 LBS | HEIGHT: 71 IN | BODY MASS INDEX: 29.54 KG/M2

## 2017-11-06 DIAGNOSIS — C49.A0 MALIGNANT GASTROINTESTINAL STROMAL TUMOR, UNSPECIFIED SITE (H): ICD-10-CM

## 2017-11-06 DIAGNOSIS — C49.A0 MALIGNANT GASTROINTESTINAL STROMAL TUMOR, UNSPECIFIED SITE (H): Primary | ICD-10-CM

## 2017-11-06 LAB
ALBUMIN SERPL-MCNC: 3.8 G/DL (ref 3.4–5)
ALP SERPL-CCNC: 64 U/L (ref 40–150)
ALT SERPL W P-5'-P-CCNC: 25 U/L (ref 0–70)
ANION GAP SERPL CALCULATED.3IONS-SCNC: 5 MMOL/L (ref 3–14)
AST SERPL W P-5'-P-CCNC: 16 U/L (ref 0–45)
BASOPHILS # BLD AUTO: 0 10E9/L (ref 0–0.2)
BASOPHILS NFR BLD AUTO: 0.4 %
BILIRUB SERPL-MCNC: 0.8 MG/DL (ref 0.2–1.3)
BUN SERPL-MCNC: 18 MG/DL (ref 7–30)
CALCIUM SERPL-MCNC: 8.4 MG/DL (ref 8.5–10.1)
CHLORIDE SERPL-SCNC: 105 MMOL/L (ref 94–109)
CO2 SERPL-SCNC: 27 MMOL/L (ref 20–32)
CREAT SERPL-MCNC: 0.91 MG/DL (ref 0.66–1.25)
DIFFERENTIAL METHOD BLD: NORMAL
EOSINOPHIL # BLD AUTO: 0.1 10E9/L (ref 0–0.7)
EOSINOPHIL NFR BLD AUTO: 2.3 %
ERYTHROCYTE [DISTWIDTH] IN BLOOD BY AUTOMATED COUNT: 12.4 % (ref 10–15)
GFR SERPL CREATININE-BSD FRML MDRD: >90 ML/MIN/1.7M2
GLUCOSE SERPL-MCNC: 96 MG/DL (ref 70–99)
HCT VFR BLD AUTO: 45 % (ref 40–53)
HGB BLD-MCNC: 15.3 G/DL (ref 13.3–17.7)
IMM GRANULOCYTES # BLD: 0 10E9/L (ref 0–0.4)
IMM GRANULOCYTES NFR BLD: 0.2 %
LYMPHOCYTES # BLD AUTO: 1.9 10E9/L (ref 0.8–5.3)
LYMPHOCYTES NFR BLD AUTO: 33 %
MCH RBC QN AUTO: 30.7 PG (ref 26.5–33)
MCHC RBC AUTO-ENTMCNC: 34 G/DL (ref 31.5–36.5)
MCV RBC AUTO: 90 FL (ref 78–100)
MONOCYTES # BLD AUTO: 0.3 10E9/L (ref 0–1.3)
MONOCYTES NFR BLD AUTO: 5.8 %
NEUTROPHILS # BLD AUTO: 3.3 10E9/L (ref 1.6–8.3)
NEUTROPHILS NFR BLD AUTO: 58.3 %
NRBC # BLD AUTO: 0 10*3/UL
NRBC BLD AUTO-RTO: 0 /100
PHOSPHATE SERPL-MCNC: 2.6 MG/DL (ref 2.5–4.5)
PLATELET # BLD AUTO: 225 10E9/L (ref 150–450)
POTASSIUM SERPL-SCNC: 4.9 MMOL/L (ref 3.4–5.3)
PROT SERPL-MCNC: 7.3 G/DL (ref 6.8–8.8)
RBC # BLD AUTO: 4.99 10E12/L (ref 4.4–5.9)
SODIUM SERPL-SCNC: 137 MMOL/L (ref 133–144)
WBC # BLD AUTO: 5.7 10E9/L (ref 4–11)

## 2017-11-06 PROCEDURE — 99214 OFFICE O/P EST MOD 30 MIN: CPT | Mod: ZP | Performed by: NURSE PRACTITIONER

## 2017-11-06 PROCEDURE — 99212 OFFICE O/P EST SF 10 MIN: CPT | Mod: ZF

## 2017-11-06 PROCEDURE — 84100 ASSAY OF PHOSPHORUS: CPT | Performed by: INTERNAL MEDICINE

## 2017-11-06 PROCEDURE — 80053 COMPREHEN METABOLIC PANEL: CPT | Performed by: INTERNAL MEDICINE

## 2017-11-06 PROCEDURE — 85025 COMPLETE CBC W/AUTO DIFF WBC: CPT | Performed by: INTERNAL MEDICINE

## 2017-11-06 PROCEDURE — 36415 COLL VENOUS BLD VENIPUNCTURE: CPT | Performed by: INTERNAL MEDICINE

## 2017-11-06 RX ORDER — IOPAMIDOL 755 MG/ML
126 INJECTION, SOLUTION INTRAVASCULAR ONCE
Status: COMPLETED | OUTPATIENT
Start: 2017-11-06 | End: 2017-11-06

## 2017-11-06 RX ADMIN — IOPAMIDOL 126 ML: 755 INJECTION, SOLUTION INTRAVASCULAR at 12:54

## 2017-11-06 ASSESSMENT — PAIN SCALES - GENERAL: PAINLEVEL: NO PAIN (0)

## 2017-11-06 NOTE — DISCHARGE INSTRUCTIONS

## 2017-11-06 NOTE — MR AVS SNAPSHOT
After Visit Summary   11/6/2017    Devin Patton    MRN: [id number]           Patient Information     Date Of Birth          1975        Visit Information        Provider Department      11/6/2017 3:30 PM Betzy Qeuen APRN [facility]        Today's Diagnoses     Malignant gastrointestinal stromal tumor, unspecified site (H)    -  1       Follow-ups after your visit        Your next 10 appointments already scheduled     May 07, 2018 11:40 AM CDT   (Arrive by 11:25 AM)   CT CHEST/ABDOMEN/PELVIS W CONTRAST with UCCT1   [facility] [place] [facility] ([facility])    [address] [address] [phone]           Please bring any scans or X-rays taken at other hospitals, if similar tests were done. Also bring a list of your medicines, including vitamins, minerals and over-the-counter drugs. It is safest to leave personal items at home.  Be sure to tell your doctor:   If you have any allergies.   If there s any chance you are pregnant.   If you are breastfeeding.   If you have any special needs.  You may have contrast for this exam. To prepare:   Do not eat or drink for 2 hours before your exam. If you need to take medicine, you may take it with small sips of water. (We may ask you to take liquid medicine as well.)   The day before your exam, drink extra fluids at least six 8-ounce glasses (unless your doctor tells you to restrict your fluids).  Patients over 70 or patients with diabetes or kidney problems:   If you haven t had a blood test (creatinine test) within the last 30 days, go to your clinic or Diagnostic Imaging Department for this test.  If you have diabetes:   If your kidney function is normal, continue taking your metformin (Avandamet, Glucophage, Glucovance, Metaglip) on the day of your exam.   If your kidney function is abnormal, wait 48 hours before restarting this medicine.  You will have oral  contrast for this exam:   You will drink the contrast at home. Get this from your clinic or Diagnostic Imaging Department. Please follow the directions given.  Please wear loose clothing, such as a sweat suit or jogging clothes. Avoid snaps, zippers and other metal. We may ask you to undress and put on a hospital gown.  If you have any questions, please call the Imaging Department where you will have your exam.            May 07, 2018  3:00 PM CDT   [facility] Lab Draw with  [employer] LAB DRAW   [facility] Lab Draw ([facility])    [address] [place] [id number] [phone]            May 07, 2018  3:30 PM CDT   (Arrive by 3:15 PM)   Return Visit with Benjamin Cho MD   [facility] ([facility])    [address] [place] [id number] [phone]              Who to contact     If you have questions or need follow up information about today's clinic visit or your schedule please contact [facility] directly at [phone].  Normal or non-critical lab and imaging results will be communicated to you by MyChart, letter or phone within 4 business days after the clinic has received the results. If you do not hear from us within 7 days, please contact the clinic through [employer] or phone. If you have a critical or abnormal lab result, we will notify you by phone as soon as possible.  Submit refill requests through [employer] or call your pharmacy and they will forward the refill request to us. Please allow 3 business days for your refill to be completed.          Additional Information About Your Visit        [employer] Information     [employer] gives you secure access to your electronic health record. If you see a primary care provider, you can also send messages to your care team and make appointments. If you have questions, please call your primary care clinic.  If you do not  "have a primary care provider, please call [phone] and they will assist you.        Care EveryWhere ID     This is your Care EveryWhere ID. This could be used by other organizations to access your [place] medical records  [id number]        Your Vitals Were     Pulse Temperature Respirations Height Pulse Oximetry BMI (Body Mass Index)    61 98.9  F (37.2  C) (Oral) 16 1.803 m (5' 11\") 97% 29.43 kg/m2       Blood Pressure from Last 3 Encounters:   11/06/17 136/73   05/01/17 123/52   10/27/16 129/77    Weight from Last 3 Encounters:   11/06/17 95.7 kg (211 lb)   05/01/17 92.5 kg (204 lb)   10/27/16 90.7 kg (199 lb 14.4 oz)                 Today's Medication Changes          These changes are accurate as of: 11/6/17 11:59 PM.  If you have any questions, ask your nurse or doctor.               Stop taking these medicines if you haven't already. Please contact your care team if you have questions.     imatinib 400 MG tablet   Commonly known as:  GLEEVEC   Stopped by:  Betzy Queen, CAROLYN CNP                    Primary Care Provider    Physician No Ref-Primary       NO REF-PRIMARY PHYSICIAN        Equal Access to Services     JANETH NAIK AH: Atul perryo Soberhane, waaxda lueleanoradaha, qaybta kaalmada ademicheline, yo pizarro . So [facility] [phone].    ATENCIÓN: Si habla español, tiene a parker disposición servicios gratuitos de asistencia lingüística. Llame al [phone].    We comply with applicable federal civil rights laws and [place] laws. We do not discriminate on the basis of race, color, national origin, age, disability, sex, sexual orientation, or gender identity.            Thank you!     Thank you for choosing [facility]  for your care. Our goal is always to provide you with excellent care. Hearing back from our patients is one way we can continue to improve our services. Please take a few minutes to complete the written survey that you may receive in the " mail after your visit with us. Thank you!             Your Updated Medication List - Protect others around you: Learn how to safely use, store and throw away your medicines at www.disposemymeds.org.          This list is accurate as of: 11/6/17 11:59 PM.  Always use your most recent med list.                   Brand Name Dispense Instructions for use Diagnosis    phosphorus tablet 250 mg 250 MG per tablet    PHOSPHA 250 NEUTRAL    84 tablet    Take 2 tablets (500 mg) by mouth 3 times daily    Hypophosphatemia

## 2017-11-06 NOTE — PROGRESS NOTES
"Reason for Visit: seen in follow-up of resected GIST    Oncology HPI: Devin Patton is a 41 year old man with resected GIST. He was diagnosed in August 2016 after passing a kidney stone. Imaging led to discovery of a large tumor arising from the stomach. He underwent robotic-assisted laparoscopic resection of a 13 cm GIST. Tumor was lowgrade with no necrosis and positive C-KIT staining. He was initiated on adjuvant Gleevec. He discontinued this after 1.5 years for family planning purposes. He returns today for routine surveillance    Interval history: Chucho is here with his wife today. They have decided not to have children due to some issues she has with her spine health. He feels much better off of gleevec. He denies abdominal pain, bloating. No N/V. Appetite is much improved, he is gaining some weight. Energy is improved. Anxiety is better controlled, although he notes high anxiety with today's visit. No blood in the stool. No difficulty with urination. No headaches, vision changes. No focal weakness.    Current Outpatient Prescriptions   Medication Sig Dispense Refill     phosphorus tablet 250 mg (K PHOS NEUTRAL) 250 MG per tablet Take 2 tablets (500 mg) by mouth 3 times daily (Patient not taking: Reported on 5/1/2017) 84 tablet 0          Allergies   Allergen Reactions     No Known Drug Allergies      Sertraline Nausea     Other reaction(s): Agitation  \"anger\"         Exam: alert, appears in NAD. Blood pressure 136/73, pulse 61, temperature 98.9  F (37.2  C), temperature source Oral, resp. rate 16, height 1.803 m (5' 11\"), weight 95.7 kg (211 lb), SpO2 97 %.  Wt Readings from Last 4 Encounters:   11/06/17 95.7 kg (211 lb)   05/01/17 92.5 kg (204 lb)   10/27/16 90.7 kg (199 lb 14.4 oz)   05/21/16 96.6 kg (213 lb)     Oropharynx is moist, no focal lesion. No icterus. Neck supple and without adenopathy. Lungs:CTA. Heart:RRR, no murmur or rub. Abdomen: soft, nontender, BS active. No masses or organomegaly. " Extremities: warm, no edema. Speech is clear.CN wnl. Gait/station wnl.     Labs: Results for ANNETTE GONZALEZ (MRN [id number]) as of 11/6/2017 15:34   Ref. Range 11/6/2017 12:04   Sodium Latest Ref Range: 133 - 144 mmol/L 137   Potassium Latest Ref Range: 3.4 - 5.3 mmol/L 4.9   Chloride Latest Ref Range: 94 - 109 mmol/L 105   Carbon Dioxide Latest Ref Range: 20 - 32 mmol/L 27   Urea Nitrogen Latest Ref Range: 7 - 30 mg/dL 18   Creatinine Latest Ref Range: 0.66 - 1.25 mg/dL 0.91   GFR Estimate Latest Ref Range: >60 mL/min/1.7m2 >90   GFR Estimate If Black Latest Ref Range: >60 mL/min/1.7m2 >90   Calcium Latest Ref Range: 8.5 - 10.1 mg/dL 8.4 (L)   Anion Gap Latest Ref Range: 3 - 14 mmol/L 5   Phosphorus Latest Ref Range: 2.5 - 4.5 mg/dL 2.6   Albumin Latest Ref Range: 3.4 - 5.0 g/dL 3.8   Protein Total Latest Ref Range: 6.8 - 8.8 g/dL 7.3   Bilirubin Total Latest Ref Range: 0.2 - 1.3 mg/dL 0.8   Alkaline Phosphatase Latest Ref Range: 40 - 150 U/L 64   ALT Latest Ref Range: 0 - 70 U/L 25   AST Latest Ref Range: 0 - 45 U/L 16   Glucose Latest Ref Range: 70 - 99 mg/dL 96   WBC Latest Ref Range: 4.0 - 11.0 10e9/L 5.7   Hemoglobin Latest Ref Range: 13.3 - 17.7 g/dL 15.3   Hematocrit Latest Ref Range: 40.0 - 53.0 % 45.0   Platelet Count Latest Ref Range: 150 - 450 10e9/L 225   RBC Count Latest Ref Range: 4.4 - 5.9 10e12/L 4.99   MCV Latest Ref Range: 78 - 100 fl 90   MCH Latest Ref Range: 26.5 - 33.0 pg 30.7   MCHC Latest Ref Range: 31.5 - 36.5 g/dL 34.0   RDW Latest Ref Range: 10.0 - 15.0 % 12.4   Diff Method Unknown Automated Method   % Neutrophils Latest Units: % 58.3   % Lymphocytes Latest Units: % 33.0   % Monocytes Latest Units: % 5.8   % Eosinophils Latest Units: % 2.3   % Basophils Latest Units: % 0.4   % Immature Granulocytes Latest Units: % 0.2   Nucleated RBCs Latest Ref Range: 0 /100 0   Absolute Neutrophil Latest Ref Range: 1.6 - 8.3 10e9/L 3.3   Absolute Lymphocytes Latest Ref Range: 0.8 - 5.3 10e9/L 1.9    Absolute Monocytes Latest Ref Range: 0.0 - 1.3 10e9/L 0.3   Absolute Eosinophils Latest Ref Range: 0.0 - 0.7 10e9/L 0.1   Absolute Basophils Latest Ref Range: 0.0 - 0.2 10e9/L 0.0   Abs Immature Granulocytes Latest Ref Range: 0 - 0.4 10e9/L 0.0   Absolute Nucleated RBC Unknown 0.0       Imaging: EXAMINATION: CT CHEST/ABDOMEN/PELVIS W CONTRAST, 11/6/2017 1:04 PM     TECHNIQUE:  Helical CT images from the thoracic inlet through the  symphysis pubis were obtained  with contrast.     COMPARISON: CT chest/abdomen/pelvis 10/27/2016, 4/25/2016. Abdominal  CT outside hospital 8/13/2015     HISTORY: Gastrointestinal stromal tumor     DLP: 667 mGy*cm     FINDINGS:     Chest:    The lungs are clear. No suspicious pulmonary nodule. No pleural  effusion or pneumothorax.     Visualized thyroid gland is normal. Central airway is midline. Heart,  aorta, and pulmonary artery are normal in size. No pathologic  lymphadenopathy in the chest.     Abdomen and pelvis:   Wedge-shaped area of hypoattenuation along the dome of the liver, new  from prior. Branching hypodensities suggesting clot within the portal  vein radicle to this segment. MR from 2015 showed hepatic attenuation  differences and thrombosed portal vein radicle, similar to the present  study.      Filling defect visualized in the proximal portion of the splenic vein  which appears to extend into the JEWELS. It is uncertain if this was  present on 5/1/2017, as the JEWELS did not opacify on that study and  mixing artifact is noted in the splenic vein, making visualization  difficult to this region.     Postsurgical changes of gastrointestinal stromal tumor resection. No  evidence of locally recurrent mass. No pathologic lymph nodes in the  abdomen or pelvis. No free fluid or free air.     The spleen appears normal. Normal kidneys and adrenal glands. Pancreas  is unremarkable.     No bowel obstruction. Mild diverticulosis of the sigmoid colon and  descending colon. No  diverticulitis. Normal gallbladder. Urinary  bladder is moderately distended.     Bones:  No suspicious lesion the bones. No soft tissue abnormality.         IMPRESSION:  1. 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 It is unclear if this was present on 5/1/2017, as there  was contrast mixing artifact. The JEWELS at that time did not enhance,  which may be technique versus thrombus.  2. 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 The portal vein had been noted to be thrombosed on the  prior studies. MR from 2015 at a similar appearance. The appearance of  the wedge-shaped defect likely phase of contrast from prior infarct.  3. No evidence of progressive or metastatic disease of  gastrointestinal stromal tumor.     Findings concerning thrombosis and possible liver infarct with Betzy Queen CNP, by Dr. Day at 2:10 PM 11/6/2017     I have personally reviewed the examination and initial interpretation  and I agree with the findings.       Impression/plan:   1. GIST, s/p resection in August 2017, s/p 1.5 years of adjuvant gleevec, stopped in May 2017.  -no evidence of recurrence on imaging today.    2. Thromboses: Reviewed imaging results with Dr. Cho. He also reviewed the films and compared to prior exams. He felt the thromboses were old when comparing prior films.The patient is asymptomatic. Will follow with imaging, but no other intervention at this time.

## 2017-11-06 NOTE — NURSING NOTE
"Oncology Rooming Note    November 6, 2017 3:26 PM   Devin Patton is a 41 year old male who presents for:    Chief Complaint   Patient presents with     Oncology Clinic Visit     Return: Gastrointestinal stromal tumo     Initial Vitals: /73  Pulse 61  Temp 98.9  F (37.2  C) (Oral)  Resp 16  Ht 1.803 m (5' 11\")  Wt 95.7 kg (211 lb)  SpO2 97%  BMI 29.43 kg/m2 Estimated body mass index is 29.43 kg/(m^2) as calculated from the following:    Height as of this encounter: 1.803 m (5' 11\").    Weight as of this encounter: 95.7 kg (211 lb). Body surface area is 2.19 meters squared.  No Pain (0) Comment: Data Unavailable   No LMP for male patient.  Allergies reviewed: Yes  Medications reviewed: Yes    Medications: Medication refills not needed today.  Pharmacy name entered into Motility Count:    [id number] - [facility] [address] AT [facility]  & [facility]  [id number] - [place], [place] - [address] [facility] [address]  [id number] - [facility] [address] AT [facility] [address]  [facility] - [place], [place] - [address]    Clinical concerns: Injectable Influenza Immunization Documentation    1.  Has the patient received the information for the injectable influenza vaccine? YES     2. Is the patient 6 months of age or older? YES     3. Does the patient have any of the following contraindications?         Severe allergy to eggs? No     Severe allergic reaction to previous influenza vaccines? No   Severe allergy to latex? No       History of Guillain-Radiant syndrome? No     Currently have a temperature greater than 100.4F? No        4.  Severely egg allergic patients should have flu vaccine eligibility assessed by an MD, RN, or pharmacist, and those who received flu vaccine should be observed for 15 min by an MD, RN, Pharmacist, Medical Technician, or member of clinic staff.\": NO    5. " Latex-allergic patients should be given latex-free influenza vaccine No. Please reference the Vaccine latex table to determine if your clinic s product is latex-containing.      4 minutes for nursing intake (face to face time)     Tyrese Tucker MA

## 2017-11-06 NOTE — LETTER
"11/6/2017       RE: Devin Patton  [address] [facility] [address]     Dear Colleague,    Thank you for referring your patient, Devin Patton, to the [facility]. Please see a copy of my visit note below.    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 He was diagnosed in August 2016 after passing a kidney stone. Imaging led to discovery of a large tumor arising from the stomach. He underwent robotic-assisted laparoscopic resection of a 13 cm GIST. Tumor was lowgrade with no necrosis and positive C-KIT staining. He was initiated on adjuvant Gleevec. He discontinued this after 1.5 years for family planning purposes. He returns today for routine surveillance    Interval history: Chucho is here with his wife today. They have decided not to have children due to some issues she has with her spine health. He feels much better off of gleevec. He denies abdominal pain, bloating. No N/V. Appetite is much improved, he is gaining some weight. Energy is improved. Anxiety is better controlled, although he notes high anxiety with today's visit. No blood in the stool. No difficulty with urination. No headaches, vision changes. No focal weakness.    Current Outpatient Prescriptions   Medication Sig Dispense Refill     phosphorus tablet 250 mg (K PHOS NEUTRAL) 250 MG per tablet Take 2 tablets (500 mg) by mouth 3 times daily (Patient not taking: Reported on 5/1/2017) 84 tablet 0          Allergies   Allergen Reactions     No Known Drug Allergies      Sertraline Nausea     Other reaction(s): Agitation  \"anger\"         Exam: alert, appears in NAD. Blood pressure 136/73, pulse 61, temperature 98.9  F (37.2  C), temperature source Oral, resp. rate 16, height 1.803 m (5' 11\"), weight 95.7 kg (211 lb), SpO2 97 %.  Wt Readings from Last 4 Encounters:   11/06/17 95.7 kg (211 lb)   05/01/17 92.5 kg (204 lb)   10/27/16 90.7 kg (199 " lb 14.4 oz)   05/21/16 96.6 kg (213 lb)     Oropharynx is moist, no focal lesion. No icterus. Neck supple and without adenopathy. Lungs:CTA. Heart:RRR, no murmur or rub. Abdomen: soft, nontender, BS active. No masses or organomegaly. Extremities: warm, no edema. Speech is clear.CN wnl. Gait/station wnl.     Labs: Results for ANNETTE GONZALEZ (MRN [id number]) as of 11/6/2017 15:34   Ref. Range 11/6/2017 12:04   Sodium Latest Ref Range: 133 - 144 mmol/L 137   Potassium Latest Ref Range: 3.4 - 5.3 mmol/L 4.9   Chloride Latest Ref Range: 94 - 109 mmol/L 105   Carbon Dioxide Latest Ref Range: 20 - 32 mmol/L 27   Urea Nitrogen Latest Ref Range: 7 - 30 mg/dL 18   Creatinine Latest Ref Range: 0.66 - 1.25 mg/dL 0.91   GFR Estimate Latest Ref Range: >60 mL/min/1.7m2 >90   GFR Estimate If Black Latest Ref Range: >60 mL/min/1.7m2 >90   Calcium Latest Ref Range: 8.5 - 10.1 mg/dL 8.4 (L)   Anion Gap Latest Ref Range: 3 - 14 mmol/L 5   Phosphorus Latest Ref Range: 2.5 - 4.5 mg/dL 2.6   Albumin Latest Ref Range: 3.4 - 5.0 g/dL 3.8   Protein Total Latest Ref Range: 6.8 - 8.8 g/dL 7.3   Bilirubin Total Latest Ref Range: 0.2 - 1.3 mg/dL 0.8   Alkaline Phosphatase Latest Ref Range: 40 - 150 U/L 64   ALT Latest Ref Range: 0 - 70 U/L 25   AST Latest Ref Range: 0 - 45 U/L 16   Glucose Latest Ref Range: 70 - 99 mg/dL 96   WBC Latest Ref Range: 4.0 - 11.0 10e9/L 5.7   Hemoglobin Latest Ref Range: 13.3 - 17.7 g/dL 15.3   Hematocrit Latest Ref Range: 40.0 - 53.0 % 45.0   Platelet Count Latest Ref Range: 150 - 450 10e9/L 225   RBC Count Latest Ref Range: 4.4 - 5.9 10e12/L 4.99   MCV Latest Ref Range: 78 - 100 fl 90   MCH Latest Ref Range: 26.5 - 33.0 pg 30.7   MCHC Latest Ref Range: 31.5 - 36.5 g/dL 34.0   RDW Latest Ref Range: 10.0 - 15.0 % 12.4   Diff Method Unknown Automated Method   % Neutrophils Latest Units: % 58.3   % Lymphocytes Latest Units: % 33.0   % Monocytes Latest Units: % 5.8   % Eosinophils Latest Units: % 2.3   % Basophils  Latest Units: % 0.4   % Immature Granulocytes Latest Units: % 0.2   Nucleated RBCs Latest Ref Range: 0 /100 0   Absolute Neutrophil Latest Ref Range: 1.6 - 8.3 10e9/L 3.3   Absolute Lymphocytes Latest Ref Range: 0.8 - 5.3 10e9/L 1.9   Absolute Monocytes Latest Ref Range: 0.0 - 1.3 10e9/L 0.3   Absolute Eosinophils Latest Ref Range: 0.0 - 0.7 10e9/L 0.1   Absolute Basophils Latest Ref Range: 0.0 - 0.2 10e9/L 0.0   Abs Immature Granulocytes Latest Ref Range: 0 - 0.4 10e9/L 0.0   Absolute Nucleated RBC Unknown 0.0       Imaging: EXAMINATION: CT CHEST/ABDOMEN/PELVIS W CONTRAST, 11/6/2017 1:04 PM     TECHNIQUE:  Helical CT images from the thoracic inlet through the  symphysis pubis were obtained  with contrast.     COMPARISON: CT chest/abdomen/pelvis 10/27/2016, 4/25/2016. Abdominal  CT outside hospital 8/13/2015     HISTORY: Gastrointestinal stromal tumor     DLP: 667 mGy*cm     FINDINGS:     Chest:    The lungs are clear. No suspicious pulmonary nodule. No pleural  effusion or pneumothorax.     Visualized thyroid gland is normal. Central airway is midline. Heart,  aorta, and pulmonary artery are normal in size. No pathologic  lymphadenopathy in the chest.     Abdomen and pelvis:   Wedge-shaped area of hypoattenuation along the dome of the liver, new  from prior. Branching hypodensities suggesting clot within the portal  vein radicle to this segment. MR from 2015 showed hepatic attenuation  differences and thrombosed portal vein radicle, similar to the present  study.      Filling defect visualized in the proximal portion of the splenic vein  which appears to extend into the JEWELS. It is uncertain if this was  present on 5/1/2017, as the JEWELS did not opacify on that study and  mixing artifact is noted in the splenic vein, making visualization  difficult to this region.     Postsurgical changes of gastrointestinal stromal tumor resection. No  evidence of locally recurrent mass. No pathologic lymph nodes in the  abdomen or  pelvis. No free fluid or free air.     The spleen appears normal. Normal kidneys and adrenal glands. Pancreas  is unremarkable.     No bowel obstruction. Mild diverticulosis of the sigmoid colon and  descending colon. No diverticulitis. Normal gallbladder. Urinary  bladder is moderately distended.     Bones:  No suspicious lesion the bones. No soft tissue abnormality.         IMPRESSION:  1. 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 It is unclear if this was present on 5/1/2017, as there  was contrast mixing artifact. The JEWELS at that time did not enhance,  which may be technique versus thrombus.  2. 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 The portal vein had been noted to be thrombosed on the  prior studies. MR from 2015 at a similar appearance. The appearance of  the wedge-shaped defect likely phase of contrast from prior infarct.  3. No evidence of progressive or metastatic disease of  gastrointestinal stromal tumor.     Findings concerning thrombosis and possible liver infarct with Betzy Queen CNP, by Dr. Day at 2:10 PM 11/6/2017     I have personally reviewed the examination and initial interpretation  and I agree with the findings.       Impression/plan:   1. GIST, s/p resection in August 2017, s/p 1.5 years of adjuvant gleevec, stopped in May 2017.  -no evidence of recurrence on imaging today.    2. Thromboses: Reviewed imaging results with Dr. Cho. He also reviewed the films and compared to prior exams. He felt the thromboses were old when comparing prior films.The patient is asymptomatic. Will follow with imaging, but no other intervention at this time.      Again, thank you for allowing me to participate in the care of your patient.      Sincerely,    CAROLYN Cortez CNP

## 2017-11-07 NOTE — PROGRESS NOTES
ORAL CHEMOTHERAPY DISCONTINUATION       Primary Oncologist:  Dr. Cho  Primary Oncology Clinic: [facility]  Cancer Diagnosis:  GIST  Therapy History:  Gleevec 400 mg daily  Start Date: October 2015  Therapy Ended On:  11/6/2017  Reason For Discontinuation: therapy completed    Additional Notes:  Thank you for the opportunity to be a part in this patient's oral chemotherapy. The oncology pharmacy will no longer be following this patient for oral chemotherapy. If there are an questions or the plan changes, feel free to contact us.    Rayna Colón, Pharmacy Intern  Oral Chemotherapy Monitoring Program  [facility]  [phone]

## 2017-11-16 ENCOUNTER — CARE COORDINATION (OUTPATIENT)
Dept: ONCOLOGY | Facility: CLINIC | Age: 42
End: 2017-11-16

## 2017-11-16 ENCOUNTER — HOSPITAL ENCOUNTER (OUTPATIENT)
Dept: LAB | Facility: CLINIC | Age: 42
Discharge: HOME | End: 2017-11-16
Attending: INTERNAL MEDICINE | Admitting: INTERNAL MEDICINE
Payer: COMMERCIAL

## 2017-11-16 DIAGNOSIS — C49.A0 GIST (GASTROINTESTINAL STROMAL TUMOR), MALIGNANT (H): Primary | ICD-10-CM

## 2017-11-16 DIAGNOSIS — C49.A0 GIST (GASTROINTESTINAL STROMAL TUMOR), MALIGNANT (H): ICD-10-CM

## 2017-11-16 PROCEDURE — 00000167 ZZHCL STATISTIC INR NC: Performed by: INTERNAL MEDICINE

## 2017-11-16 PROCEDURE — 00000401 ZZHCL STATISTIC THROMBIN TIME NC: Performed by: INTERNAL MEDICINE

## 2017-11-16 PROCEDURE — 36415 COLL VENOUS BLD VENIPUNCTURE: CPT | Performed by: INTERNAL MEDICINE

## 2017-11-16 PROCEDURE — 86146 BETA-2 GLYCOPROTEIN ANTIBODY: CPT | Performed by: INTERNAL MEDICINE

## 2017-11-16 PROCEDURE — 86147 CARDIOLIPIN ANTIBODY EA IG: CPT | Performed by: INTERNAL MEDICINE

## 2017-11-16 PROCEDURE — 85613 RUSSELL VIPER VENOM DILUTED: CPT | Performed by: INTERNAL MEDICINE

## 2017-11-16 PROCEDURE — 85730 THROMBOPLASTIN TIME PARTIAL: CPT | Performed by: INTERNAL MEDICINE

## 2017-11-16 NOTE — PROGRESS NOTES
Received a call from Nitin who is concerned about his CT findings of a new visualized thrombus in his splenic veins.  He states that his father has some sort of clotting disorder and has been on coumadin for the past 10 years.  He is concerned that he might have something hereditary as well.  He is asking for labs to check for antiphospholipid syndrome.  He is scheduled for labs today at 1:30 at [facility].  Let him know we will follow up with the results.  He verbalized understanding of the plan of care.

## 2017-11-17 LAB
B2 GLYCOPROT1 IGG SERPL IA-ACNC: 0.7 U/ML
B2 GLYCOPROT1 IGM SERPL IA-ACNC: <0.9 U/ML

## 2017-11-20 LAB
CARDIOLIPIN ANTIBODY IGG: <1.6 GPL-U/ML (ref 0–19.9)
CARDIOLIPIN ANTIBODY IGM: 0.3 MPL-U/ML (ref 0–19.9)
LA PPP-IMP: NEGATIVE

## 2017-12-09 ENCOUNTER — NURSE TRIAGE (OUTPATIENT)
Dept: NURSING | Facility: CLINIC | Age: 42
End: 2017-12-09

## 2017-12-09 NOTE — TELEPHONE ENCOUNTER
Reason for Call: Pt requests to speak to the on-call provider about new Symptom - anal pain  Hx of Sarcoma removed from lower abdomin about 2 years ago  , and declines triage for now but advised could call back for FNA  triage .  Patient Recommendations/Teaching: Page  [place] to on call provider Dr FLORIDALMA Mota for Dr PAUL Cho to Pt at  [phone] @ 1054am   .Goldie Galazra RN [place] nurse advisors.

## 2018-05-07 ENCOUNTER — RADIANT APPOINTMENT (OUTPATIENT)
Dept: CT IMAGING | Facility: CLINIC | Age: 43
End: 2018-05-07
Attending: NURSE PRACTITIONER
Payer: COMMERCIAL

## 2018-05-07 ENCOUNTER — APPOINTMENT (OUTPATIENT)
Dept: LAB | Facility: CLINIC | Age: 43
End: 2018-05-07
Payer: COMMERCIAL

## 2018-05-07 ENCOUNTER — ONCOLOGY VISIT (OUTPATIENT)
Dept: ONCOLOGY | Facility: CLINIC | Age: 43
End: 2018-05-07
Attending: INTERNAL MEDICINE
Payer: COMMERCIAL

## 2018-05-07 VITALS
SYSTOLIC BLOOD PRESSURE: 112 MMHG | RESPIRATION RATE: 16 BRPM | DIASTOLIC BLOOD PRESSURE: 62 MMHG | WEIGHT: 212 LBS | HEART RATE: 56 BPM | TEMPERATURE: 98.1 F | BODY MASS INDEX: 29.57 KG/M2 | OXYGEN SATURATION: 100 %

## 2018-05-07 DIAGNOSIS — C49.A0 MALIGNANT GASTROINTESTINAL STROMAL TUMOR, UNSPECIFIED SITE (H): ICD-10-CM

## 2018-05-07 LAB
ALBUMIN SERPL-MCNC: 2.4 G/DL (ref 3.4–5)
ALBUMIN SERPL-MCNC: 3.6 G/DL (ref 3.4–5)
ALP SERPL-CCNC: 42 U/L (ref 40–150)
ALP SERPL-CCNC: 68 U/L (ref 40–150)
ALT SERPL W P-5'-P-CCNC: 11 U/L (ref 0–70)
ALT SERPL W P-5'-P-CCNC: 22 U/L (ref 0–70)
ANION GAP SERPL CALCULATED.3IONS-SCNC: 12 MMOL/L (ref 3–14)
ANION GAP SERPL CALCULATED.3IONS-SCNC: 6 MMOL/L (ref 3–14)
AST SERPL W P-5'-P-CCNC: 13 U/L (ref 0–45)
AST SERPL W P-5'-P-CCNC: 18 U/L (ref 0–45)
BASOPHILS # BLD AUTO: 0 10E9/L (ref 0–0.2)
BASOPHILS NFR BLD AUTO: 0.2 %
BILIRUB SERPL-MCNC: 0.3 MG/DL (ref 0.2–1.3)
BILIRUB SERPL-MCNC: 0.6 MG/DL (ref 0.2–1.3)
BUN SERPL-MCNC: 12 MG/DL (ref 7–30)
BUN SERPL-MCNC: 13 MG/DL (ref 7–30)
CALCIUM SERPL-MCNC: 5.8 MG/DL (ref 8.5–10.1)
CALCIUM SERPL-MCNC: 8.4 MG/DL (ref 8.5–10.1)
CHLORIDE SERPL-SCNC: 110 MMOL/L (ref 94–109)
CHLORIDE SERPL-SCNC: 98 MMOL/L (ref 94–109)
CO2 SERPL-SCNC: 14 MMOL/L (ref 20–32)
CO2 SERPL-SCNC: 25 MMOL/L (ref 20–32)
CREAT SERPL-MCNC: 0.68 MG/DL (ref 0.66–1.25)
CREAT SERPL-MCNC: 0.89 MG/DL (ref 0.66–1.25)
DIFFERENTIAL METHOD BLD: NORMAL
EOSINOPHIL # BLD AUTO: 0.1 10E9/L (ref 0–0.7)
EOSINOPHIL NFR BLD AUTO: 2.6 %
ERYTHROCYTE [DISTWIDTH] IN BLOOD BY AUTOMATED COUNT: 12.9 % (ref 10–15)
GFR SERPL CREATININE-BSD FRML MDRD: >90 ML/MIN/1.7M2
GFR SERPL CREATININE-BSD FRML MDRD: >90 ML/MIN/1.7M2
GLUCOSE SERPL-MCNC: 108 MG/DL (ref 70–99)
GLUCOSE SERPL-MCNC: 72 MG/DL (ref 70–99)
HCT VFR BLD AUTO: 41.7 % (ref 40–53)
HGB BLD-MCNC: 14.7 G/DL (ref 13.3–17.7)
IMM GRANULOCYTES # BLD: 0 10E9/L (ref 0–0.4)
IMM GRANULOCYTES NFR BLD: 0.2 %
LYMPHOCYTES # BLD AUTO: 1.5 10E9/L (ref 0.8–5.3)
LYMPHOCYTES NFR BLD AUTO: 36.2 %
MCH RBC QN AUTO: 31.6 PG (ref 26.5–33)
MCHC RBC AUTO-ENTMCNC: 35.3 G/DL (ref 31.5–36.5)
MCV RBC AUTO: 90 FL (ref 78–100)
MONOCYTES # BLD AUTO: 0.3 10E9/L (ref 0–1.3)
MONOCYTES NFR BLD AUTO: 6.9 %
NEUTROPHILS # BLD AUTO: 2.3 10E9/L (ref 1.6–8.3)
NEUTROPHILS NFR BLD AUTO: 53.9 %
NRBC # BLD AUTO: 0 10*3/UL
NRBC BLD AUTO-RTO: 0 /100
PHOSPHATE SERPL-MCNC: 2.3 MG/DL (ref 2.5–4.5)
PLATELET # BLD AUTO: 192 10E9/L (ref 150–450)
POTASSIUM SERPL-SCNC: 2.9 MMOL/L (ref 3.4–5.3)
POTASSIUM SERPL-SCNC: 4 MMOL/L (ref 3.4–5.3)
PROT SERPL-MCNC: 5.1 G/DL (ref 6.8–8.8)
PROT SERPL-MCNC: 7.1 G/DL (ref 6.8–8.8)
RBC # BLD AUTO: 4.65 10E12/L (ref 4.4–5.9)
SODIUM SERPL-SCNC: 124 MMOL/L (ref 133–144)
SODIUM SERPL-SCNC: 140 MMOL/L (ref 133–144)
WBC # BLD AUTO: 4.2 10E9/L (ref 4–11)

## 2018-05-07 PROCEDURE — 36592 COLLECT BLOOD FROM PICC: CPT

## 2018-05-07 PROCEDURE — 84100 ASSAY OF PHOSPHORUS: CPT | Performed by: INTERNAL MEDICINE

## 2018-05-07 PROCEDURE — 85025 COMPLETE CBC W/AUTO DIFF WBC: CPT | Performed by: NURSE PRACTITIONER

## 2018-05-07 PROCEDURE — 99214 OFFICE O/P EST MOD 30 MIN: CPT | Mod: ZP | Performed by: INTERNAL MEDICINE

## 2018-05-07 PROCEDURE — 80053 COMPREHEN METABOLIC PANEL: CPT | Performed by: NURSE PRACTITIONER

## 2018-05-07 PROCEDURE — G0463 HOSPITAL OUTPT CLINIC VISIT: HCPCS | Mod: ZF

## 2018-05-07 PROCEDURE — 80053 COMPREHEN METABOLIC PANEL: CPT | Performed by: INTERNAL MEDICINE

## 2018-05-07 RX ORDER — IOPAMIDOL 755 MG/ML
130 INJECTION, SOLUTION INTRAVASCULAR ONCE
Status: COMPLETED | OUTPATIENT
Start: 2018-05-07 | End: 2018-05-07

## 2018-05-07 RX ADMIN — IOPAMIDOL 130 ML: 755 INJECTION, SOLUTION INTRAVASCULAR at 11:34

## 2018-05-07 ASSESSMENT — PAIN SCALES - GENERAL: PAINLEVEL: NO PAIN (0)

## 2018-05-07 NOTE — DISCHARGE INSTRUCTIONS

## 2018-05-07 NOTE — PROGRESS NOTES
Devin Ptaton is here today in follow-up of GIST.    He had a low-grade tumor resected from his stomach about 3 years ago. He had about 2 years of adjuvant Gleevec and then chose to discontinue that. He is here today for planned surveillance for recurrence. He is feeling quite well overall. He tells me his problems with anxiety are markedly improved, though not entirely resolved. He has excellent appetite and energy. He's noted no new adenopathy. He has had no respiratory symptoms. In fact the remainder of the 10 point review of systems is completely normal.    On physical exam he is alert and well-appearing. His vital signs are in the chart and are unremarkable.  He has no icterus or visible lesion in the oropharynx.  He has no adenopathy palpable in the neck or supraclavicular spaces. His thyroid is unremarkable.  His lungs are clear to auscultation at dullness to percussion.  His heart rate and rhythm are regular without audible murmur or gallop. His jugular venous pressure appears normal.  His abdomen is soft and nontender without palpable mass or organomegaly.  There is no peripheral edema and no tenderness in his calves or thighs. He has no cyanosis or clubbing of his digits.  His cranial nerves are grossly intact. His gait and station unremarkable.    I reviewed with the patient and his wife his lab work and CT scans. His scan shows no evidence of recurrence and there is one vague possible 2 mm nodule in his lung that's probably of no pathologic significance. He has the changes of his prior surgery without any evidence of local or regional recurrence. His blood counts are normal. His electrolytes are abnormal but I suspect he had a diluted specimen and we will get his labs repeated.    Assessment/plan: Resected low risk gastrointestinal stromal tumor. He currently is without evidence of disease will follow-up again in one year with another CT scan and labs. I will let the patient know if his repeat labs  reflect significant abnormality.

## 2018-05-07 NOTE — NURSING NOTE
"Oncology Rooming Note    May 7, 2018 3:48 PM   Devin Patton is a 42 year old male who presents for:    Chief Complaint   Patient presents with     Blood Draw     IV drawn by MA     Oncology Clinic Visit     Return for GIST , CT results      Initial Vitals: /62  Pulse 56  Temp 98.1  F (36.7  C) (Oral)  Resp 16  Wt 96.2 kg (212 lb)  SpO2 100%  BMI 29.57 kg/m2 Estimated body mass index is 29.57 kg/(m^2) as calculated from the following:    Height as of 11/6/17: 1.803 m (5' 11\").    Weight as of this encounter: 96.2 kg (212 lb). Body surface area is 2.2 meters squared.  No Pain (0) Comment: Data Unavailable   No LMP for male patient.  Allergies reviewed: Yes  Medications reviewed: Yes    Medications: Medication refills not needed today.  Pharmacy name entered into EPIC:    [facility] - [place], [place] - [address] AT [facility] OF  & [facility]  [facility] - [place], [place] - [address] [facility] & [address]  [facility] - [place], [place] - [address] AT [facility] OF [facility] & [address]  [facility] - [place], [place] - [address]    Clinical concerns: results  Tammie was notified.    6  minutes for nursing intake (face to face time)     Janis Slaughter MA                "

## 2018-05-07 NOTE — MR AVS SNAPSHOT
After Visit Summary   5/7/2018    Devin Patton    MRN: [id number]           Patient Information     Date Of Birth          1975        Visit Information        Provider Department      5/7/2018 3:30 PM Benjamin Cho MD [facility]        Today's Diagnoses     Malignant gastrointestinal stromal tumor, unspecified site (H)           Follow-ups after your visit        Follow-up notes from your care team     Return in about 1 year (around 5/7/2019) for MD visit with CT and labs.      Your next 10 appointments already scheduled     May 06, 2019 11:30 AM CDT   Lab with  LAB   [facility] ([facility])    [address] [address] [phone]            May 06, 2019 12:00 PM CDT   CT CHEST ABDOMEN PELVIS W/O & W CONTRAST with UCCT1   [facility] CT ([facility])    [address] [id number] [phone]           Please bring any scans or X-rays taken at other hospitals, if similar tests were done. Also bring a list of your medicines, including vitamins, minerals and over-the-counter drugs. It is safest to leave personal items at home.  Be sure to tell your doctor:   If you have any allergies.   If there s any chance you are pregnant.   If you are breastfeeding.  How to prepare:   Do not eat or drink for 2 hours before your exam. If you need to take medicine, you may take it with small sips of water. (We may ask you to take liquid medicine as well.)   Please wear loose clothing, such as a sweat suit or jogging clothes. Avoid snaps, zippers and other metal. We may ask you to undress and put on a hospital gown.  Please arrive 30 minutes early for your CT. Once in the department you might be asked to drink water 15-20 minutes prior to your exam.  If indicated you may be asked to drink an oral contrast in advance of your CT.  If this is the  case, the imaging team will let you know or be in contact with you prior to your appointment  Patients over 70 or patients with diabetes or kidney problems:   If you haven t had a blood test (creatinine test) within the last 30 days, the Cardiologist/Radiologist may require you to get this test prior to your exam.  If you have diabetes:   Continue to take your metformin medication on the day of your exam  If you have any questions, please call the Imaging Department where you will have your exam.            May 06, 2019  3:00 PM CDT   (Arrive by 2:45 PM)   Return Visit with Benjamin Cho MD   [facility] ([facility])    [address]  [address]  [facility] [address] [phone]              Who to contact     If you have questions or need follow up information about today's clinic visit or your schedule please contact [facility] directly at [phone].  Normal or non-critical lab and imaging results will be communicated to you by APTwaterhart, letter or phone within 4 business days after the clinic has received the results. If you do not hear from us within 7 days, please contact the clinic through Ads-Fit or phone. If you have a critical or abnormal lab result, we will notify you by phone as soon as possible.  Submit refill requests through PhytoCeutica or call your pharmacy and they will forward the refill request to us. Please allow 3 business days for your refill to be completed.          Additional Information About Your Visit        PhytoCeutica Information     PhytoCeutica gives you secure access to your electronic health record. If you see a primary care provider, you can also send messages to your care team and make appointments. If you have questions, please call your primary care clinic.  If you do not have a primary care provider, please call [phone] and they will assist you.        Care EveryWhere ID     This is your Care  EveryWhere ID. This could be used by other organizations to access your [facility] medical records  [id number]        Your Vitals Were     Pulse Temperature Respirations Pulse Oximetry BMI (Body Mass Index)       56 98.1  F (36.7  C) (Oral) 16 100% 29.57 kg/m2        Blood Pressure from Last 3 Encounters:   05/07/18 112/62   11/06/17 136/73   05/01/17 123/52    Weight from Last 3 Encounters:   05/07/18 96.2 kg (212 lb)   11/06/17 95.7 kg (211 lb)   05/01/17 92.5 kg (204 lb)              We Performed the Following     CBC with platelets differential     Comprehensive metabolic panel     Comprehensive metabolic panel     Phosphorus        Primary Care Provider Fax #    Physician No Ref-Primary [phone]       No address on file        Equal Access to Services     JANETH NAIK : Atul Granado, ritu gregg, kymberly kaalmabe lopez, yo pizarro . So [facility] [phone].    ATENCIÓN: Si habla español, tiene a parker disposición servicios gratuitos de asistencia lingüística. Llame al [phone].    We comply with applicable federal civil rights laws and [place] laws. We do not discriminate on the basis of race, color, national origin, age, disability, sex, sexual orientation, or gender identity.            Thank you!     Thank you for choosing [facility]  for your care. Our goal is always to provide you with excellent care. Hearing back from our patients is one way we can continue to improve our services. Please take a few minutes to complete the written survey that you may receive in the mail after your visit with us. Thank you!             Your Updated Medication List - Protect others around you: Learn how to safely use, store and throw away your medicines at www.disposemymeds.org.          This list is accurate as of 5/7/18 11:59 PM.  Always use your most recent med list.                   Brand Name Dispense Instructions for use Diagnosis     phosphorus tablet 250 mg 250 MG per tablet    PHOSPHA 250 NEUTRAL    84 tablet    Take 2 tablets (500 mg) by mouth 3 times daily    Hypophosphatemia

## 2018-05-07 NOTE — NURSING NOTE
Chief Complaint   Patient presents with     Blood Draw     IV drawn by MA   Vitals done and labs drawn, see flow sheets. IV removed from right antecub. Pt. Tolerated well.   CELESTINA HuangA

## 2018-05-09 ENCOUNTER — CARE COORDINATION (OUTPATIENT)
Dept: ONCOLOGY | Facility: CLINIC | Age: 43
End: 2018-05-09

## 2018-05-09 NOTE — PROGRESS NOTES
"Returned call to patient to answer questions regarding labs drawn on 5/7/18. Patient had to have labs redrawn as it was suspected first set of labs were diluted from being drawn after CT. Patient inquiring which result is the \"real result.\" Unable to reach patient. LM for patient stating labs under Dr. Cho were the \"correct\" labs. It is believed the labs under Betzy Queen were labs that were the diluted specimen. Asked patient to return call with any additional questions or concerns.    "

## 2018-07-16 ENCOUNTER — TELEPHONE (OUTPATIENT)
Dept: ONCOLOGY | Facility: CLINIC | Age: 43
End: 2018-07-16

## 2018-07-16 NOTE — TELEPHONE ENCOUNTER
Pt called to update that he went to [facility] [place] last night for right ribcage pain that felt like a pulled muscle but he didn't do any activity to pull a muscle.  Lately he has had a few bouts of stabbing pain below his right shoulder blade.  He had an ultrasound of his gallbladder, results not posted yet.  He had labs and UA, was ruled out as having an infection. VSS, results on Care Everywhere.  He has had some occasional chills, no fevers. States there is virus going around his office. He is concerned regarding increased gas and bloating lately which is causing him some anxiety. Will forward note to care team.

## 2019-03-19 ENCOUNTER — MYC MEDICAL ADVICE (OUTPATIENT)
Dept: CARDIOLOGY | Facility: CLINIC | Age: 44
End: 2019-03-19

## 2019-03-19 NOTE — TELEPHONE ENCOUNTER
Received reply from patient regarding pre visit questions. See below:    Message from Wir3s sent at 3/19/2019  3:14 PM CDT -----     -  The reason for my appointment is to have my heart health checked as I have a rather extensive history of heart problems in my family and I was on Gleevec for a period of 2 years as I am a cancer patient in remission and Gleevec has been known to potentially damage the heart.  Finally I exercise extensively and travel internationally on a regular basis and I want to make sure there are no underlying conditions that could present themselves while I'm exercising or in a foreign country.   -  I have never seen a cardiologist   -  No   -  I am not currently on any type of medications     FLAQUITO Barajas

## 2019-03-22 ENCOUNTER — OFFICE VISIT (OUTPATIENT)
Dept: CARDIOLOGY | Facility: CLINIC | Age: 44
End: 2019-03-22
Payer: COMMERCIAL

## 2019-03-22 VITALS
WEIGHT: 216.8 LBS | HEIGHT: 72 IN | BODY MASS INDEX: 29.36 KG/M2 | HEART RATE: 56 BPM | SYSTOLIC BLOOD PRESSURE: 139 MMHG | DIASTOLIC BLOOD PRESSURE: 72 MMHG | OXYGEN SATURATION: 96 %

## 2019-03-22 DIAGNOSIS — R00.1 BRADYCARDIA: ICD-10-CM

## 2019-03-22 DIAGNOSIS — C49.A0 MALIGNANT GASTROINTESTINAL STROMAL TUMOR, UNSPECIFIED SITE (H): Primary | ICD-10-CM

## 2019-03-22 LAB
CHOLEST SERPL-MCNC: 185 MG/DL
HDLC SERPL-MCNC: 60 MG/DL
LDLC SERPL CALC-MCNC: 107 MG/DL
NONHDLC SERPL-MCNC: 125 MG/DL
TRIGL SERPL-MCNC: 91 MG/DL

## 2019-03-22 PROCEDURE — 99203 OFFICE O/P NEW LOW 30 MIN: CPT | Mod: 25 | Performed by: INTERNAL MEDICINE

## 2019-03-22 PROCEDURE — 93000 ELECTROCARDIOGRAM COMPLETE: CPT | Mod: GC | Performed by: INTERNAL MEDICINE

## 2019-03-22 PROCEDURE — 36415 COLL VENOUS BLD VENIPUNCTURE: CPT | Performed by: INTERNAL MEDICINE

## 2019-03-22 PROCEDURE — 80061 LIPID PANEL: CPT | Performed by: INTERNAL MEDICINE

## 2019-03-22 ASSESSMENT — PAIN SCALES - GENERAL: PAINLEVEL: NO PAIN (0)

## 2019-03-22 ASSESSMENT — MIFFLIN-ST. JEOR: SCORE: 1926.4

## 2019-03-22 NOTE — PROGRESS NOTES
"    Cardiology Clinic Note    HPI:    43-year-old with history of gastrointestinal stromal tumor patient presents for preventive care evaluation.  He had been taking Gleevec for several years, but is now off.  His cancer appears cured, and his risk of remission is quite low.  His last dose of Gleevec was over 2 years ago.  He does state that his father has coronary artery disease and had a myocardial infarction at age 71.  Patient is very healthy and does not smoke; is on no long-term medication.  He states he exercises 4-5 times Via [employer] every week, and has no significant symptoms.  He has not had his lipids checked since 2007.  He currently is following the keto diet, and inquires whether this is actually beneficial for his heart.    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       PAST SURGICAL HISTORY:  Past Surgical History:   Procedure Laterality Date     C NONSPECIFIC PROCEDURE      wisdome teeth extracted       ALLERGIES:     Allergies   Allergen Reactions     Sertraline        FAMILY HISTORY:  Family History   Problem Relation Age of Onset     Diabetes Maternal Grandfather      Family History Negative Father      Blood Disease Mother         polycythemia vera         SOCIAL HISTORY:  Social History     Tobacco Use     Smoking status: Never Smoker     Smokeless tobacco: Current User     Types: Chew   Substance Use Topics     Alcohol use: No     Alcohol/week: 0.0 oz     Comment: alcohol abuse      Drug use: No       ROS:   Constitutional: No fever, chills, or sweats. Weight stable.   ENT: No visual disturbance, ear ache, epistaxis, sore " throat.   Cardiovascular: As per HPI.   Respiratory: No cough, hemoptysis.    GI: No nausea, vomiting, hematemesis, melena, or hematochezia.   : No hematuria.   Integument: Negative.   Psychiatric: Negative.   Hematologic:  Easy bruising, no easy bleeding.  Neuro: Negative.   Endocrinology: No significant heat or cold intolerance   Musculoskeletal: No myalgia.    Exam:  /72   Pulse 56   Ht 1.829 m (6')   Wt 98.3 kg (216 lb 12.8 oz)   SpO2 96%   BMI 29.40 kg/m    GENERAL APPEARANCE: healthy, alert and no distress  HEENT: no icterus, no xanthelasmas, normal pupil size and reaction, normal palate, mucosa moist, no central cyanosis  NECK: no adenopathy, no asymmetry, masses, or scars, thyroid normal to palpation and no bruits, JVP not elevated  RESPIRATORY: lungs clear to auscultation - no rales, rhonchi or wheezes, no use of accessory muscles, no retractions, respirations are unlabored, normal respiratory rate  CARDIOVASCULAR: regular rhythm, normal S1 with physiologic split S2, no S3 or S4 and no murmur, click or rub, precordium quiet with normal PMI.  ABDOMEN: soft, non tender, without hepatosplenomegaly, no masses palpable, bowel sounds normal, aorta not enlarged by palpation, no abdominal bruits  EXTREMITIES: peripheral pulses normal, no edema, no bruits  NEURO: alert and oriented to person/place/time, normal speech, gait and affect  VASC: Radial, femoral, dorsalis pedis and posterior tibialis pulses are normal in volumes and symmetric bilaterally. No bruits are heard.  SKIN: no ecchymoses, no rashes    Labs:  CBC RESULTS:   Lab Results   Component Value Date    WBC 4.2 05/07/2018    RBC 4.65 05/07/2018    HGB 14.7 05/07/2018    HCT 41.7 05/07/2018    MCV 90 05/07/2018    MCH 31.6 05/07/2018    MCHC 35.3 05/07/2018    RDW 12.9 05/07/2018     05/07/2018       BMP RESULTS:  Lab Results   Component Value Date     05/07/2018    POTASSIUM 4.0 05/07/2018    CHLORIDE 110 (H) 05/07/2018    CO2 25  05/07/2018    ANIONGAP 6 05/07/2018     (H) 05/07/2018    BUN 13 05/07/2018    CR 0.89 05/07/2018    GFRESTIMATED >90 05/07/2018    GFRESTBLACK >90 05/07/2018    NADEEM 8.4 (L) 05/07/2018        INR RESULTS:  No results found for: INR    Procedures:  PULMONARY FUNCTION TESTS:   No flowsheet data found.      ECHOCARDIOGRAM:   No results found for this or any previous visit (from the past 8760 hour(s)).    ECG  NSR with HR at 57 bpm, good r wave progression, no q waves.       Assessment and Plan:     43-year-old with history of gastrointestinal stromal tumor patient presents for preventive care evaluation.      #History of GIST tumor, s/p Gleevac treatment  #Family H/o CAD (Dad MI age 71)    -Patient is very healthy and does not smoke; is on no long-term medication.  He states he exercises 4-5 times Via [employer] every week, and has no significant symptoms.  He has not had his lipids checked since 2007, and currently follows Keto diet.  -We a long discussion with the patient about cardiovascular prevention.  We congratulated him for his current lifestyle, and his frequent exercising and non-smoking.  Gleevec has been known to cause cardiomyopathies and cardiac toxicity at very rare occasions, and long-term effects of Gleevec or not well characterized.  However, patient currently is asymptomatic and is doing quite well.  After discussion, we elected to proceed with an echocardiogram to establish a baseline left ventricular function.  He will have that done here at the [facility].  -We discussed with him the keto diet, and reiterated that the Mediterranean diet has been proven to be heart healthy.  He stated that he understood, and would likely elect to follow diets that are not so high in saturated fats.  In the meanwhile, we will check his lipid panel today.  -We took a look at his CT scan today to assess for any coronary calcium, and fortunately the patient has none.    #HTN  -Blood pressure  is high today at systolic 139; however patient endorses that he frequently has whitecoat hypertension and anxiety surrounding his visits to the doctor.  We asked him to monitor his blood pressures with his other appointments to his primary care and oncologist, and if his blood pressures remain high to alert us.    No need for long term cardiac follow up.    Thank you for allowing me to care for this patient. This has been discussed with the attending physician.    Raphael Quan MD  Cardiology Fellow, PGY-5    [facility]    I have seen and examined the patient, reviewed labs and tests. I have discussed my findings and treatment recommendations with the house staff and/or Cardiology fellow and agree with their assessment and plan as outlined in the note.    Hussein Gonzalez MD    Cardiac Imaging and Prevention  [facility]  Pager: [phone]    CC  SELF, REFERRED

## 2019-03-22 NOTE — PATIENT INSTRUCTIONS
The following is a summary of your office visit:    Medications started today:    Medications stopped today:    Medication dose change:     Nurse contact information: Maci Andrew RN  Cardiology Care Coordinator  [phone] Phone  [phone] Fax    Appointments made today:     Patient instructions: Please follow up to get your ECHO done after your return from your trip to [facility]. Please go to labs today to get your lipids done. Follow up it is going to based after the results from your ECHO.      If you have had any blood work, imaging or other testing completed we will be in touch within 1-2 weeks regarding the results. If you have any questions, concerns or need to schedule a follow up, please contact us at [phone]. If you are needing refills please contact your pharmacy. For urgent after hour care please call the [facility] at [phone] or the [facility] at [phone] and ask to speak to the cardiologist on call.    It was a pleasure meeting with you today. Please let us know if there is anything else we can do for you so that we can be sure you are leaving completely satisfied with your care experience.     Your Cardiology Team at [facility]  RN Care Coordinator: Maci DEL VALLE: Adriana

## 2019-04-04 ENCOUNTER — TELEPHONE (OUTPATIENT)
Dept: CARDIOLOGY | Facility: CLINIC | Age: 44
End: 2019-04-04

## 2019-04-04 ENCOUNTER — ANCILLARY PROCEDURE (OUTPATIENT)
Dept: CARDIOLOGY | Facility: CLINIC | Age: 44
End: 2019-04-04
Attending: INTERNAL MEDICINE
Payer: COMMERCIAL

## 2019-04-04 DIAGNOSIS — Q21.12 PFO (PATENT FORAMEN OVALE): Primary | ICD-10-CM

## 2019-04-04 DIAGNOSIS — C49.A0 MALIGNANT GASTROINTESTINAL STROMAL TUMOR, UNSPECIFIED SITE (H): ICD-10-CM

## 2019-04-04 PROCEDURE — 93306 TTE W/DOPPLER COMPLETE: CPT | Performed by: INTERNAL MEDICINE

## 2019-04-04 NOTE — TELEPHONE ENCOUNTER
Called and spoke to patient, results not reviewed by Dr Gonzalez yet. Will probably have results by tomorrow and will call him or can release into My Chart.   He admits he is quite anxious about the results because of his cancer history. The tech who performed the test also told him a bubble study was done and he is wondering about those results. Assured him that if the bubble study was positive, it is not a new condition.   Will contact the patient tomorrow with results.     FLAQUITO Barajas

## 2019-04-04 NOTE — TELEPHONE ENCOUNTER
[facility] Call Center    Phone Message    May a detailed message be left on voicemail: yes    Reason for Call: Requesting Results   Name/type of test: Echo  Date of test: 04/04/2019    Patient is requesting a call today if possible.  He is a former cancer patient and said he just wants to make sure everything is ok. Please advise.  Thank you.       Action Taken: Message routed to:  Adult Clinics: Cardiology p [id number]

## 2019-04-05 NOTE — TELEPHONE ENCOUNTER
Pt called back to schedule tests that were discussed in previous conversation. Pt will await clinic's call to schedule.

## 2019-04-05 NOTE — TELEPHONE ENCOUNTER
Patient requesting results. Patient said he would not be so concerned but he had to have additional testing that was unexpected and is causing him concern.  Please advise.  Thank you.

## 2019-04-05 NOTE — TELEPHONE ENCOUNTER
Devin called back. Dr. Gonzalez took this call and informed patient that his septum was moving more than usual therefore the bubble study was done which confirmed the PFO. This is an incidental finding. In addition, his right ventricle is slightly enlarged is due active lifestyle. Dr. Gonzalez offered further testing to evaluate PFO now to provide Chucho with reasurrance or repeat Echocardiogram in a couple of years to make sure no further changes. If patient chooses to do further imaging now, he would offer him a ROS or a Cardiac MRI. Overall, Dr. Gonzalez is not concerned about the findings though would like to provide Chucho with reassurance if he wishes to proceed with further imaging.     Chucho will think about this and talk to his wife and will call the clinic back when he decides how he would like to proceed.    Alexa Genao RN

## 2019-04-05 NOTE — TELEPHONE ENCOUNTER
4.5.19  Patient calling back for echo results.  Hopefully can give patient a call back today.  [phone]

## 2019-04-05 NOTE — TELEPHONE ENCOUNTER
Patient called back. He would like to have the ROS set up for him. Order in. Briefly explained that he would need a  and it would take place at the [facility].   Will have Jaci in scheduling set up appt and call patient back,.   FLAQUITO Barajas

## 2019-04-06 ENCOUNTER — NURSE TRIAGE (OUTPATIENT)
Dept: NURSING | Facility: CLINIC | Age: 44
End: 2019-04-06

## 2019-04-06 ENCOUNTER — TELEPHONE (OUTPATIENT)
Dept: CARDIOLOGY | Facility: CLINIC | Age: 44
End: 2019-04-06

## 2019-04-06 NOTE — TELEPHONE ENCOUNTER
Brief Cardiology Fellow Note    Patient called to discuss his echocardiogram from 4/4/19.  He is concerned that his echocardiogram report mentioned an atrial-septal aneurysm and mild to moderate concentric wall thickness consistent with left ventricular hypertrophy. Dr. Gonzalez had previously discussed the patient's echocardiogram results with the patient.  His echocardiogram showed that he likely has PFO and his right ventricle is mildly dilated to to his athletic lifestyle.  They discussed follow-up imaging in a few years versus more definitive imaging with ROS or cardiac MRI.  Patient elected to pursue ROS.  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      Carmen Acosta MD, PhD  Cardiology Fellow

## 2019-04-06 NOTE — TELEPHONE ENCOUNTER
Patient calling. He is concerned about some My Chart test results.    States there is a test result that he needs explained and he would like to talk to the cardiologist on call.     Asked if his questions could wait until Monday but he was insistent on talking to someone.    Cardiologist on call paged to speak with patient via [facility] .    Protocol and care advice reviewed  Caller states understanding of the recommended disposition  Advised to call back if further questions or concerns      Reason for Disposition    [1] Caller requesting NON-URGENT health information AND [2] PCP's office is the best resource    Additional Information    Negative: RN needs further essential information from caller in order to complete triage    Negative: Requesting regular office appointment    Protocols used: INFORMATION ONLY CALL-ADULT-

## 2019-04-18 ENCOUNTER — HOSPITAL ENCOUNTER (OUTPATIENT)
Dept: CARDIOLOGY | Facility: CLINIC | Age: 44
Discharge: HOME | End: 2019-04-18
Attending: INTERNAL MEDICINE | Admitting: INTERNAL MEDICINE
Payer: COMMERCIAL

## 2019-04-18 VITALS
DIASTOLIC BLOOD PRESSURE: 61 MMHG | HEART RATE: 63 BPM | SYSTOLIC BLOOD PRESSURE: 109 MMHG | RESPIRATION RATE: 16 BRPM | OXYGEN SATURATION: 96 %

## 2019-04-18 DIAGNOSIS — Q21.12 PFO (PATENT FORAMEN OVALE): ICD-10-CM

## 2019-04-18 PROCEDURE — 93325 DOPPLER ECHO COLOR FLOW MAPG: CPT | Mod: 26 | Performed by: INTERNAL MEDICINE

## 2019-04-18 PROCEDURE — 25000125 ZZHC RX 250: Performed by: INTERNAL MEDICINE

## 2019-04-18 PROCEDURE — 93312 ECHO TRANSESOPHAGEAL: CPT | Mod: 26 | Performed by: INTERNAL MEDICINE

## 2019-04-18 PROCEDURE — 99152 MOD SED SAME PHYS/QHP 5/>YRS: CPT

## 2019-04-18 PROCEDURE — 93325 DOPPLER ECHO COLOR FLOW MAPG: CPT

## 2019-04-18 PROCEDURE — 93320 DOPPLER ECHO COMPLETE: CPT | Mod: 26 | Performed by: INTERNAL MEDICINE

## 2019-04-18 PROCEDURE — 25000128 H RX IP 250 OP 636: Performed by: INTERNAL MEDICINE

## 2019-04-18 RX ORDER — FENTANYL CITRATE 50 UG/ML
25-50 INJECTION, SOLUTION INTRAMUSCULAR; INTRAVENOUS
Status: DISCONTINUED | OUTPATIENT
Start: 2019-04-18 | End: 2019-04-19 | Stop reason: HOSPADM

## 2019-04-18 RX ORDER — SODIUM CHLORIDE 9 MG/ML
INJECTION, SOLUTION INTRAVENOUS CONTINUOUS PRN
Status: DISCONTINUED | OUTPATIENT
Start: 2019-04-18 | End: 2019-04-19 | Stop reason: HOSPADM

## 2019-04-18 RX ORDER — FENTANYL CITRATE 50 UG/ML
50-100 INJECTION, SOLUTION INTRAMUSCULAR; INTRAVENOUS
Status: COMPLETED | OUTPATIENT
Start: 2019-04-18 | End: 2019-04-18

## 2019-04-18 RX ORDER — LIDOCAINE 40 MG/G
CREAM TOPICAL
Status: DISCONTINUED | OUTPATIENT
Start: 2019-04-18 | End: 2019-04-19 | Stop reason: HOSPADM

## 2019-04-18 RX ORDER — FLUMAZENIL 0.1 MG/ML
0.2 INJECTION, SOLUTION INTRAVENOUS
Status: DISCONTINUED | OUTPATIENT
Start: 2019-04-18 | End: 2019-04-19 | Stop reason: HOSPADM

## 2019-04-18 RX ORDER — NALOXONE HYDROCHLORIDE 0.4 MG/ML
.1-.4 INJECTION, SOLUTION INTRAMUSCULAR; INTRAVENOUS; SUBCUTANEOUS
Status: DISCONTINUED | OUTPATIENT
Start: 2019-04-18 | End: 2019-04-19 | Stop reason: HOSPADM

## 2019-04-18 RX ADMIN — TOPICAL ANESTHETIC 0.5 ML: 200 SPRAY DENTAL; PERIODONTAL at 08:33

## 2019-04-18 RX ADMIN — LIDOCAINE HYDROCHLORIDE 30 ML: 20 SOLUTION ORAL; TOPICAL at 08:32

## 2019-04-18 RX ADMIN — FENTANYL CITRATE 100 MCG: 50 INJECTION INTRAMUSCULAR; INTRAVENOUS at 09:00

## 2019-04-18 RX ADMIN — MIDAZOLAM 3 MG: 1 INJECTION INTRAMUSCULAR; INTRAVENOUS at 09:00

## 2019-04-18 NOTE — PROGRESS NOTES
Pt arrived in ECHO department  for scheduled ROS.   Procedure explained, questions answered and consent signed. Discharge instructions discussed with patient.  Pt's throat sprayed at 0830, therefore pt will not be able to eat or drink until 2 hours after at 1030.  Informed pt of this time and encouraged to start with warm fluids and soft foods.    Pt tolerated procedure well, and was given a total of 100 mcg IV fentanyl and 3 mg IV versed for conscious sedation.  Pt denied throat or chest pain after ROS complete.   ROS probe 51 used for procedure.  Pt denied C.P or throat pain after procedure and was D/C home after awake and VSS.  Escorted out to front lobby by staff in w/c to meet pt's ride home.

## 2019-05-06 ENCOUNTER — ONCOLOGY VISIT (OUTPATIENT)
Dept: ONCOLOGY | Facility: CLINIC | Age: 44
End: 2019-05-06
Attending: INTERNAL MEDICINE
Payer: COMMERCIAL

## 2019-05-06 ENCOUNTER — ANCILLARY PROCEDURE (OUTPATIENT)
Dept: CT IMAGING | Facility: CLINIC | Age: 44
End: 2019-05-06
Attending: INTERNAL MEDICINE
Payer: COMMERCIAL

## 2019-05-06 VITALS
OXYGEN SATURATION: 99 % | DIASTOLIC BLOOD PRESSURE: 69 MMHG | HEART RATE: 60 BPM | TEMPERATURE: 98 F | SYSTOLIC BLOOD PRESSURE: 115 MMHG | WEIGHT: 222 LBS | BODY MASS INDEX: 30.11 KG/M2 | RESPIRATION RATE: 16 BRPM

## 2019-05-06 DIAGNOSIS — C49.A0 MALIGNANT GASTROINTESTINAL STROMAL TUMOR, UNSPECIFIED SITE (H): ICD-10-CM

## 2019-05-06 DIAGNOSIS — C49.A2 MALIGNANT GASTROINTESTINAL STROMAL TUMOR (GIST) OF STOMACH (H): Primary | ICD-10-CM

## 2019-05-06 LAB
ALBUMIN SERPL-MCNC: 3.6 G/DL (ref 3.4–5)
ALP SERPL-CCNC: 78 U/L (ref 40–150)
ALT SERPL W P-5'-P-CCNC: 24 U/L (ref 0–70)
ANION GAP SERPL CALCULATED.3IONS-SCNC: 4 MMOL/L (ref 3–14)
AST SERPL W P-5'-P-CCNC: 23 U/L (ref 0–45)
BASOPHILS # BLD AUTO: 0 10E9/L (ref 0–0.2)
BASOPHILS NFR BLD AUTO: 0.3 %
BILIRUB SERPL-MCNC: 0.7 MG/DL (ref 0.2–1.3)
BUN SERPL-MCNC: 23 MG/DL (ref 7–30)
CALCIUM SERPL-MCNC: 9.2 MG/DL (ref 8.5–10.1)
CHLORIDE SERPL-SCNC: 109 MMOL/L (ref 94–109)
CO2 SERPL-SCNC: 26 MMOL/L (ref 20–32)
CREAT SERPL-MCNC: 0.97 MG/DL (ref 0.66–1.25)
DIFFERENTIAL METHOD BLD: NORMAL
EOSINOPHIL # BLD AUTO: 0.1 10E9/L (ref 0–0.7)
EOSINOPHIL NFR BLD AUTO: 1.5 %
ERYTHROCYTE [DISTWIDTH] IN BLOOD BY AUTOMATED COUNT: 12.7 % (ref 10–15)
GFR SERPL CREATININE-BSD FRML MDRD: >90 ML/MIN/{1.73_M2}
GLUCOSE SERPL-MCNC: 101 MG/DL (ref 70–99)
HCT VFR BLD AUTO: 47.1 % (ref 40–53)
HGB BLD-MCNC: 15.9 G/DL (ref 13.3–17.7)
IMM GRANULOCYTES # BLD: 0 10E9/L (ref 0–0.4)
IMM GRANULOCYTES NFR BLD: 0.3 %
LYMPHOCYTES # BLD AUTO: 2.6 10E9/L (ref 0.8–5.3)
LYMPHOCYTES NFR BLD AUTO: 28.2 %
MCH RBC QN AUTO: 30.5 PG (ref 26.5–33)
MCHC RBC AUTO-ENTMCNC: 33.8 G/DL (ref 31.5–36.5)
MCV RBC AUTO: 90 FL (ref 78–100)
MONOCYTES # BLD AUTO: 0.6 10E9/L (ref 0–1.3)
MONOCYTES NFR BLD AUTO: 6.2 %
NEUTROPHILS # BLD AUTO: 5.8 10E9/L (ref 1.6–8.3)
NEUTROPHILS NFR BLD AUTO: 63.5 %
NRBC # BLD AUTO: 0 10*3/UL
NRBC BLD AUTO-RTO: 0 /100
PLATELET # BLD AUTO: 230 10E9/L (ref 150–450)
POTASSIUM SERPL-SCNC: 4 MMOL/L (ref 3.4–5.3)
PROT SERPL-MCNC: 7.4 G/DL (ref 6.8–8.8)
RBC # BLD AUTO: 5.21 10E12/L (ref 4.4–5.9)
SODIUM SERPL-SCNC: 139 MMOL/L (ref 133–144)
WBC # BLD AUTO: 9.2 10E9/L (ref 4–11)

## 2019-05-06 PROCEDURE — G0463 HOSPITAL OUTPT CLINIC VISIT: HCPCS | Mod: ZF

## 2019-05-06 PROCEDURE — 99214 OFFICE O/P EST MOD 30 MIN: CPT | Mod: GC | Performed by: INTERNAL MEDICINE

## 2019-05-06 PROCEDURE — G0463 HOSPITAL OUTPT CLINIC VISIT: HCPCS

## 2019-05-06 PROCEDURE — 80053 COMPREHEN METABOLIC PANEL: CPT | Performed by: INTERNAL MEDICINE

## 2019-05-06 PROCEDURE — 36415 COLL VENOUS BLD VENIPUNCTURE: CPT | Performed by: INTERNAL MEDICINE

## 2019-05-06 PROCEDURE — 85025 COMPLETE CBC W/AUTO DIFF WBC: CPT | Performed by: INTERNAL MEDICINE

## 2019-05-06 RX ORDER — IOPAMIDOL 755 MG/ML
132 INJECTION, SOLUTION INTRAVASCULAR ONCE
Status: COMPLETED | OUTPATIENT
Start: 2019-05-06 | End: 2019-05-06

## 2019-05-06 RX ADMIN — IOPAMIDOL 132 ML: 755 INJECTION, SOLUTION INTRAVASCULAR at 12:38

## 2019-05-06 ASSESSMENT — PAIN SCALES - GENERAL: PAINLEVEL: NO PAIN (0)

## 2019-05-06 NOTE — LETTER
"5/6/2019      RE: Devin Patton  [id number] [address] [facility] [address] [address]       [facility]  Outpatient Progress Note  Last clinic visit: 5/7/18    Hem/onc History:   Devin Patton is here today in follow-up of GIST, resected 9/24/15. Initial stage was [id number]. He had about 2 years of adjuvant Gleevec and then chose to discontinue that. He is here today for planned surveillance for recurrence    Interval history:   Mr. Patton presents with his wife Ni today.    He has been doing well overall and is symptomatically stable.  He notes that over the past few weeks he has had increased sinus drainage.  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 Their 9-year-old is enrolled in a Chinese immersion school and speaks conversational Mandarin.  Their four-year-old will soon be enrolling and he and his wife plan to learn Mandarin as well.    He and his family continue to travel as they enjoy snorkeling and thinks it is quite therapeutic for his anxiety.  They enjoy going to [place] and the surrounding areas and this year will be also going to [place] and [place].    He notes that he and his family have engaged in more healthy eating including trying to cut out sugar as much as possible from the diet.    Comprehensive ROS otherwise negative.      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/2015     Lumbago      hx \"disc herniation lumbar with sciatica sx occ RLE\" per pt     Other anxiety states     hx panic attacks     Tobacco use disorder       Past Surgical History:   Reviewed in EPIC    Past Surgical History:   Procedure Laterality Date     C NONSPECIFIC " PROCEDURE      wisdome teeth extracted      Social History:   Reviewed in EPIC    Social History     Tobacco Use     Smoking status: Never Smoker     Smokeless tobacco: Current User     Types: Chew   Substance Use Topics     Alcohol use: No     Alcohol/week: 0.0 oz     Comment: alcohol abuse       Family History:   Reviewed in EPIC    Family History   Problem Relation Age of Onset     Diabetes Maternal Grandfather      Family History Negative Father      Blood Disease Mother         polycythemia vera      Physical Exam (Resident / Clinician):   Blood pressure 115/69, pulse 60, temperature 98  F (36.7  C), temperature source Oral, resp. rate 16, weight 100.7 kg (222 lb 0.1 oz), SpO2 99 %.    ECOG PS: 0  Constitutional: WDWN male in NAD, pleasant and appropriate  HEENT:  NC/AT, no icterus, OP clear, MMM  Skin: No jaundice nor ecchymoses  Lungs: CTAB, no w/r/r, nonlabored breathing  Cardiovascular: RRR, S1, S2, no m/r/g  GI/Abdomen: +BS, soft, nontender, nondistended, no organomegaly nor masses  Back: no tenderness over spinous processes, iliac crests, scapulae  MSK/Extremities: Warm, well perfused. No edema  LN: no cervical, supraclavicular, axillary, nor inguinal lymphadenopathy  Neurologic: alert, answering questions appropriately, moving all extremities spontaneously  Psych: appropriate affect  Data:   Reviewed in EPIC and notable for:    Creatinine 0.97, remainder of CMP within normal limits  WBC count 9.2, hemoglobin 15.9, platelet 230    CT chest abdomen pelvis performed 5/6/2019  Final radiology report pending, reviewed with patient and did not note any evidence of recurrence.    Assessment and Plan:     # GIST    Mr. Patton is now almost 4 years from resection of low risk GIST. He is still quite anxious but admits that it has been improving. He is doing well and is without evidence of recurrence.    Follow up: RTC 1 year with labs and imaging.    Labs, imaging and treatment plan reviewed with patient. All  questions answered.    In this 35 minutes visit, > 50% spent in counseling and coordinating care.    Patient seen and discussed with Dr. Cho.    Alvino Posadas MD ([place]), PhD   Hem/Onc Fellow      Attending note: I saw the patient in conjunction with the fellow and agree with the above.      Benjamin Cho MD

## 2019-05-06 NOTE — NURSING NOTE
Oncology Rooming Note    May 6, 2019 2:45 PM   Devin Patton is a 43 year old male who presents for:    Chief Complaint   Patient presents with     Oncology Clinic Visit     GIST 1 Yr , labs, CT      Initial Vitals: /69   Pulse 60   Temp 98  F (36.7  C) (Oral)   Resp 16   Wt 100.7 kg (222 lb 0.1 oz)   SpO2 99%   BMI 30.11 kg/m   Estimated body mass index is 30.11 kg/m  as calculated from the following:    Height as of 3/22/19: 1.829 m (6').    Weight as of this encounter: 100.7 kg (222 lb 0.1 oz). Body surface area is 2.26 meters squared.  No Pain (0) Comment: Data Unavailable   No LMP for male patient.  Allergies reviewed: Yes  Medications reviewed: Yes    Medications: Medication refills not needed today.  Pharmacy name entered into [employer]:    [id number] - [place], [place] - [address] AT [facility] [address]  [address]  [id number] - [place], [place] - [address] [facility] [address]  [id number] - [place] - [address] [facility] [address] [facility] [address] [facility] [address]  [facility] - [place], [place] - [address]  [facility] [id number] [facility] [employer] - [place], [place] - [address]    Clinical concerns: CT results  Tammie was notified.      Janis Slaughter MA

## 2019-05-06 NOTE — DISCHARGE INSTRUCTIONS

## 2019-05-06 NOTE — PROGRESS NOTES
"[facility]  Outpatient Progress Note  Last clinic visit: 5/7/18    Hem/onc History:   Devin Patton is here today in follow-up of GIST, resected 9/24/15. Initial stage was [id number]. He had about 2 years of adjuvant Gleevec and then chose to discontinue that. He is here today for planned surveillance for recurrence    Interval history:   Mr. Patton presents with his wife Ni today.    He has been doing well overall and is symptomatically stable.  He notes that over the past few weeks he has had increased sinus drainage.  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 Their 9-year-old is enrolled in a Chinese immersion school and speaks conversational Mandarin.  Their four-year-old will soon be enrolling and he and his wife plan to learn Mandarin as well.    He and his family continue to travel as they enjoy snorkeling and thinks it is quite therapeutic for his anxiety.  They enjoy going to [place] and the surrounding areas and this year will be also going to [place] and [place].    He notes that he and his family have engaged in more healthy eating including trying to cut out sugar as much as possible from the diet.    Comprehensive ROS otherwise negative.      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/2015     Lumbago      hx \"disc herniation lumbar with sciatica sx occ RLE\" per pt     Other anxiety states     hx panic attacks     Tobacco use disorder       Past Surgical History:   Reviewed in EPIC    Past Surgical History:   Procedure Laterality Date     C NONSPECIFIC PROCEDURE      wisdome teeth extracted      Social History:   Reviewed in " EPIC    Social History     Tobacco Use     Smoking status: Never Smoker     Smokeless tobacco: Current User     Types: Chew   Substance Use Topics     Alcohol use: No     Alcohol/week: 0.0 oz     Comment: alcohol abuse       Family History:   Reviewed in EPIC    Family History   Problem Relation Age of Onset     Diabetes Maternal Grandfather      Family History Negative Father      Blood Disease Mother         polycythemia vera      Physical Exam (Resident / Clinician):   Blood pressure 115/69, pulse 60, temperature 98  F (36.7  C), temperature source Oral, resp. rate 16, weight 100.7 kg (222 lb 0.1 oz), SpO2 99 %.    ECOG PS: 0  Constitutional: WDWN male in NAD, pleasant and appropriate  HEENT:  NC/AT, no icterus, OP clear, MMM  Skin: No jaundice nor ecchymoses  Lungs: CTAB, no w/r/r, nonlabored breathing  Cardiovascular: RRR, S1, S2, no m/r/g  GI/Abdomen: +BS, soft, nontender, nondistended, no organomegaly nor masses  Back: no tenderness over spinous processes, iliac crests, scapulae  MSK/Extremities: Warm, well perfused. No edema  LN: no cervical, supraclavicular, axillary, nor inguinal lymphadenopathy  Neurologic: alert, answering questions appropriately, moving all extremities spontaneously  Psych: appropriate affect  Data:   Reviewed in EPIC and notable for:    Creatinine 0.97, remainder of CMP within normal limits  WBC count 9.2, hemoglobin 15.9, platelet 230    CT chest abdomen pelvis performed 5/6/2019  Final radiology report pending, reviewed with patient and did not note any evidence of recurrence.    Assessment and Plan:     # GIST    Mr. Patton is now almost 4 years from resection of low risk GIST. He is still quite anxious but admits that it has been improving. He is doing well and is without evidence of recurrence.    Follow up: RTC 1 year with labs and imaging.    Labs, imaging and treatment plan reviewed with patient. All questions answered.    In this 35 minutes visit, > 50% spent in counseling and  coordinating care.    Patient seen and discussed with Dr. Cho.    Alvino ([place]) MD Katharina, PhD   Hem/Onc Fellow      Attending note: I saw the patient in conjunction with the fellow and agree with the above.

## 2019-05-24 ENCOUNTER — TRANSFERRED RECORDS (OUTPATIENT)
Dept: HEALTH INFORMATION MANAGEMENT | Facility: CLINIC | Age: 44
End: 2019-05-24

## 2019-10-04 ENCOUNTER — HEALTH MAINTENANCE LETTER (OUTPATIENT)
Age: 44
End: 2019-10-04

## 2020-02-02 ENCOUNTER — NURSE TRIAGE (OUTPATIENT)
Dept: NURSING | Facility: CLINIC | Age: 45
End: 2020-02-02

## 2020-02-02 NOTE — TELEPHONE ENCOUNTER
S: Devin calling about possible Malaria.  B: Was in [place] for 3 weeks diving in the ocean.  While there was bitten 3-4 times by a mosquito.  His symptoms are nausea, severe diarrhea, 100.0, muscle aches, fatigue, chills.  Calls himself's a hypochondriac because he has survived cancer.  He is wondering how soon he should come in to be tested for possible Malaria.  A: Writer will page provider per patients request.  R: Paged Kris Saab from [facility] at 1445 to call patient at home [phone].  Sarah Thurman RN, [facility]    Additional Information    Negative: Sounds like a life-threatening emergency to the triager    Negative: Child sounds very sick or weak to the triager    Negative: [1] Tropical disease already diagnosed AND [2] getting worse    Negative: [1] Tropical disease already diagnosed AND [2] shaking chills return    Negative: [1] Caller has urgent question about tropical disease already diagnosed AND [2] triager unable to answer    Negative: [1] Tropical disease already diagnosed AND [2] fever returns after gone for several days    Negative: [1] Caller has non-urgent question about tropical disease already diagnosed AND [2] triager unable to answer    Negative: [1] Upcoming travel to tropics AND [2] needs vaccines or preventive medicines    Negative: [1] Pregnant woman AND [2] recent travel to (or lives in) high risk area for Zika virus infections (see CDC website)    Malaria, questions about    Protocols used: MOSQUITO-BORNE DISEASES FROM TRAVEL-P-

## 2020-05-04 ENCOUNTER — VIRTUAL VISIT (OUTPATIENT)
Dept: ONCOLOGY | Facility: CLINIC | Age: 45
End: 2020-05-04
Attending: INTERNAL MEDICINE
Payer: COMMERCIAL

## 2020-05-04 ENCOUNTER — ANCILLARY PROCEDURE (OUTPATIENT)
Dept: CT IMAGING | Facility: CLINIC | Age: 45
End: 2020-05-04
Attending: INTERNAL MEDICINE
Payer: COMMERCIAL

## 2020-05-04 DIAGNOSIS — C49.A0 MALIGNANT GASTROINTESTINAL STROMAL TUMOR, UNSPECIFIED SITE (H): Primary | ICD-10-CM

## 2020-05-04 DIAGNOSIS — C49.A2 MALIGNANT GASTROINTESTINAL STROMAL TUMOR (GIST) OF STOMACH (H): ICD-10-CM

## 2020-05-04 LAB
ALBUMIN SERPL-MCNC: 3.8 G/DL (ref 3.4–5)
ALP SERPL-CCNC: 67 U/L (ref 40–150)
ALT SERPL W P-5'-P-CCNC: 27 U/L (ref 0–70)
ANION GAP SERPL CALCULATED.3IONS-SCNC: 4 MMOL/L (ref 3–14)
AST SERPL W P-5'-P-CCNC: 20 U/L (ref 0–45)
BASOPHILS # BLD AUTO: 0 10E9/L (ref 0–0.2)
BASOPHILS NFR BLD AUTO: 0.4 %
BILIRUB SERPL-MCNC: 0.6 MG/DL (ref 0.2–1.3)
BUN SERPL-MCNC: 21 MG/DL (ref 7–30)
CALCIUM SERPL-MCNC: 9.1 MG/DL (ref 8.5–10.1)
CHLORIDE SERPL-SCNC: 110 MMOL/L (ref 94–109)
CO2 SERPL-SCNC: 26 MMOL/L (ref 20–32)
CREAT SERPL-MCNC: 0.93 MG/DL (ref 0.66–1.25)
DIFFERENTIAL METHOD BLD: NORMAL
EOSINOPHIL # BLD AUTO: 0.1 10E9/L (ref 0–0.7)
EOSINOPHIL NFR BLD AUTO: 1.9 %
ERYTHROCYTE [DISTWIDTH] IN BLOOD BY AUTOMATED COUNT: 12.7 % (ref 10–15)
GFR SERPL CREATININE-BSD FRML MDRD: >90 ML/MIN/{1.73_M2}
GLUCOSE SERPL-MCNC: 101 MG/DL (ref 70–99)
HCT VFR BLD AUTO: 46 % (ref 40–53)
HGB BLD-MCNC: 16 G/DL (ref 13.3–17.7)
IMM GRANULOCYTES # BLD: 0 10E9/L (ref 0–0.4)
IMM GRANULOCYTES NFR BLD: 0.3 %
LYMPHOCYTES # BLD AUTO: 2.4 10E9/L (ref 0.8–5.3)
LYMPHOCYTES NFR BLD AUTO: 35.9 %
MCH RBC QN AUTO: 30.9 PG (ref 26.5–33)
MCHC RBC AUTO-ENTMCNC: 34.8 G/DL (ref 31.5–36.5)
MCV RBC AUTO: 89 FL (ref 78–100)
MONOCYTES # BLD AUTO: 0.5 10E9/L (ref 0–1.3)
MONOCYTES NFR BLD AUTO: 7 %
NEUTROPHILS # BLD AUTO: 3.7 10E9/L (ref 1.6–8.3)
NEUTROPHILS NFR BLD AUTO: 54.5 %
NRBC # BLD AUTO: 0 10*3/UL
NRBC BLD AUTO-RTO: 0 /100
PLATELET # BLD AUTO: 249 10E9/L (ref 150–450)
POTASSIUM SERPL-SCNC: 4.9 MMOL/L (ref 3.4–5.3)
PROT SERPL-MCNC: 7.7 G/DL (ref 6.8–8.8)
RBC # BLD AUTO: 5.17 10E12/L (ref 4.4–5.9)
SODIUM SERPL-SCNC: 140 MMOL/L (ref 133–144)
WBC # BLD AUTO: 6.7 10E9/L (ref 4–11)

## 2020-05-04 PROCEDURE — 85025 COMPLETE CBC W/AUTO DIFF WBC: CPT | Performed by: INTERNAL MEDICINE

## 2020-05-04 PROCEDURE — 36415 COLL VENOUS BLD VENIPUNCTURE: CPT | Performed by: INTERNAL MEDICINE

## 2020-05-04 PROCEDURE — 80053 COMPREHEN METABOLIC PANEL: CPT | Performed by: INTERNAL MEDICINE

## 2020-05-04 PROCEDURE — 99214 OFFICE O/P EST MOD 30 MIN: CPT | Mod: 95 | Performed by: INTERNAL MEDICINE

## 2020-05-04 RX ORDER — IOPAMIDOL 755 MG/ML
135 INJECTION, SOLUTION INTRAVASCULAR ONCE
Status: COMPLETED | OUTPATIENT
Start: 2020-05-04 | End: 2020-05-04

## 2020-05-04 RX ADMIN — IOPAMIDOL 135 ML: 755 INJECTION, SOLUTION INTRAVASCULAR at 13:17

## 2020-05-04 NOTE — PROGRESS NOTES
"Devin Patton is a 44 year old male who is being evaluated via a billable video visit.      The patient has been notified of following:     \"This video visit will be conducted via a call between you and your physician/provider. We have found that certain health care needs can be provided without the need for an in-person physical exam.  This service lets us provide the care you need with a video conversation.  If a prescription is necessary we can send it directly to your pharmacy.  If lab work is needed we can place an order for that and you can then stop by our lab to have the test done at a later time.    Video visits are billed at different rates depending on your insurance coverage.  Please reach out to your insurance provider with any questions.    If during the course of the call the physician/provider feels a video visit is not appropriate, you will not be charged for this service.\"    *Pt has no new needs or concerns  Parris Chand CMA on 5/4/2020 at 3:40 PM      Patient has given verbal consent for Video visit? Yes    How would you like to obtain your AVS? Jarod    Patient would like the video invitation sent by: Text to cell phone: [phone]    Will anyone else be joining your video visit? No        Provider notes:    [facility]  Outpatient Progress Note  Last clinic visit: 5/6/19     Hem/onc History:   Devin Patton is here today in follow-up of GIST, resected 9/24/15. Initial stage was [id number]. He had about 2 years of adjuvant Gleevec and then chose to discontinue that. He is here today for planned surveillance for recurrence     Interval history:   Mr. Patton presents with his wife Ni today.     He has been doing well overall and is symptomatically stable. He has noted not adenopathy, change in weight or appetite, change bowel habits.  He and his wife continue to work full time from home.  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     He and his family travelled to [place] and [place] this past year, but currently are confined to home by the pandemic    10-point ROS otherwise negative.        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     Lumbago        hx \"disc herniation lumbar with sciatica sx occ RLE\" per pt     Other anxiety states       hx panic attacks     Tobacco use disorder           Past Surgical History:   Reviewed in EPIC     Past Surgical History         Past Surgical History:   Procedure Laterality Date     C NONSPECIFIC PROCEDURE         wisdome teeth extracted         Social History:   Reviewed in EPIC     Social History            Tobacco Use     Smoking status: Never Smoker     Smokeless tobacco: Current User       Types: Chew   Substance Use Topics     Alcohol use: No       Alcohol/week: 0.0 oz       Comment: alcohol abuse       Family History:   Reviewed in EPIC     Family History         Family History   Problem Relation Age of Onset     Diabetes Maternal Grandfather       Family History Negative Father       Blood Disease Mother           polycythemia vera         Physical Exam :       GENERAL: healthy, alert and no distress  EYES: Eyes grossly normal to inspection, conjunctivae and sclerae normal  RESP: no audible wheeze, cough, or visible cyanosis.  No visible retractions or increased work of breathing.  Able to speak fully in complete sentences.  NEURO: Cranial nerves grossly intact, mentation intact and speech normal  PSYCH: mentation appears normal, affect normal/bright, judgement and insight intact, normal speech and appearance well-groomed      Data:   Ct and labs were reviewed with them. There is no evidence of recurrence on the scan and electrolytes, renal fucntion, liver enzymes and blood counts are all " normal     Assessment and Plan:      # GIST     Mr. Patton is now 5 years from resection of low risk GIST. He is doing much better with his anxiety. Physically he is doing well and is without evidence of recurrence.     Follow up: RTC 1 year with labs and imaging.      Video-Visit Details    Type of service:  Video Visit    Video Start Time: 4:30  Video End Time: 5:00    Originating Location (pt. Location): Home    Distant Location (provider location):  [facility]     Platform used for Video Visit: Petrona Cho MD

## 2020-11-14 ENCOUNTER — HEALTH MAINTENANCE LETTER (OUTPATIENT)
Age: 45
End: 2020-11-14

## 2020-11-27 ENCOUNTER — TELEPHONE (OUTPATIENT)
Dept: ONCOLOGY | Facility: CLINIC | Age: 45
End: 2020-11-27

## 2020-11-27 NOTE — TELEPHONE ENCOUNTER
"Spoke with patient, he calls with low pulse readings from is smart watch over the past two weeks. Pulse readings have been ranging high forties, to 50s-60s bpm (lowest 48 bpm). He mentions tinnitus and \"cloudiness\" with decreased pulse reading episodes. He also states he has mild intermittent lightheadedness and unexplained fatigue.     Denies chest pain, shortness of breath, dizziness, numbness/tingling, heart palpitations, fever, chills.     Advised evaluation in urgent care or with PCP today. He verbalizes understanding and will present to urgent care today for evaluation.  "

## 2021-03-10 ENCOUNTER — IMMUNIZATION (OUTPATIENT)
Dept: PEDIATRICS | Facility: CLINIC | Age: 46
End: 2021-03-10
Payer: COMMERCIAL

## 2021-03-10 PROCEDURE — 0001A PR COVID VAC PFIZER DIL RECON 30 MCG/0.3 ML IM: CPT

## 2021-03-10 PROCEDURE — 91300 PR COVID VAC PFIZER DIL RECON 30 MCG/0.3 ML IM: CPT

## 2021-03-31 ENCOUNTER — IMMUNIZATION (OUTPATIENT)
Dept: PEDIATRICS | Facility: CLINIC | Age: 46
End: 2021-03-31
Attending: INTERNAL MEDICINE
Payer: COMMERCIAL

## 2021-03-31 PROCEDURE — 91300 PR COVID VAC PFIZER DIL RECON 30 MCG/0.3 ML IM: CPT

## 2021-03-31 PROCEDURE — 0002A PR COVID VAC PFIZER DIL RECON 30 MCG/0.3 ML IM: CPT

## 2021-05-09 ENCOUNTER — NURSE TRIAGE (OUTPATIENT)
Dept: NURSING | Facility: CLINIC | Age: 46
End: 2021-05-09

## 2021-05-10 ENCOUNTER — ANCILLARY PROCEDURE (OUTPATIENT)
Dept: CT IMAGING | Facility: CLINIC | Age: 46
End: 2021-05-10
Attending: INTERNAL MEDICINE
Payer: COMMERCIAL

## 2021-05-10 ENCOUNTER — VIRTUAL VISIT (OUTPATIENT)
Dept: ONCOLOGY | Facility: CLINIC | Age: 46
End: 2021-05-10
Attending: INTERNAL MEDICINE
Payer: COMMERCIAL

## 2021-05-10 DIAGNOSIS — C49.A0 MALIGNANT GASTROINTESTINAL STROMAL TUMOR, UNSPECIFIED SITE (H): ICD-10-CM

## 2021-05-10 DIAGNOSIS — C49.A2 MALIGNANT GASTROINTESTINAL STROMAL TUMOR (GIST) OF STOMACH (H): Primary | ICD-10-CM

## 2021-05-10 LAB
ALBUMIN SERPL-MCNC: 3.6 G/DL (ref 3.4–5)
ALP SERPL-CCNC: 61 U/L (ref 40–150)
ALT SERPL W P-5'-P-CCNC: 75 U/L (ref 0–70)
ANION GAP SERPL CALCULATED.3IONS-SCNC: 7 MMOL/L (ref 3–14)
AST SERPL W P-5'-P-CCNC: 26 U/L (ref 0–45)
BASOPHILS # BLD AUTO: 0 10E9/L (ref 0–0.2)
BASOPHILS NFR BLD AUTO: 0.3 %
BILIRUB SERPL-MCNC: 0.5 MG/DL (ref 0.2–1.3)
BUN SERPL-MCNC: 18 MG/DL (ref 7–30)
CALCIUM SERPL-MCNC: 9 MG/DL (ref 8.5–10.1)
CHLORIDE SERPL-SCNC: 107 MMOL/L (ref 94–109)
CO2 SERPL-SCNC: 25 MMOL/L (ref 20–32)
CREAT SERPL-MCNC: 0.94 MG/DL (ref 0.66–1.25)
DIFFERENTIAL METHOD BLD: NORMAL
EOSINOPHIL # BLD AUTO: 0.2 10E9/L (ref 0–0.7)
EOSINOPHIL NFR BLD AUTO: 2.4 %
ERYTHROCYTE [DISTWIDTH] IN BLOOD BY AUTOMATED COUNT: 13.1 % (ref 10–15)
GFR SERPL CREATININE-BSD FRML MDRD: >90 ML/MIN/{1.73_M2}
GLUCOSE SERPL-MCNC: 95 MG/DL (ref 70–99)
HCT VFR BLD AUTO: 46.2 % (ref 40–53)
HGB BLD-MCNC: 15.7 G/DL (ref 13.3–17.7)
IMM GRANULOCYTES # BLD: 0 10E9/L (ref 0–0.4)
IMM GRANULOCYTES NFR BLD: 0.2 %
LYMPHOCYTES # BLD AUTO: 2.4 10E9/L (ref 0.8–5.3)
LYMPHOCYTES NFR BLD AUTO: 39.3 %
MCH RBC QN AUTO: 29.7 PG (ref 26.5–33)
MCHC RBC AUTO-ENTMCNC: 34 G/DL (ref 31.5–36.5)
MCV RBC AUTO: 87 FL (ref 78–100)
MONOCYTES # BLD AUTO: 0.4 10E9/L (ref 0–1.3)
MONOCYTES NFR BLD AUTO: 6.9 %
NEUTROPHILS # BLD AUTO: 3.1 10E9/L (ref 1.6–8.3)
NEUTROPHILS NFR BLD AUTO: 50.9 %
NRBC # BLD AUTO: 0 10*3/UL
NRBC BLD AUTO-RTO: 0 /100
PLATELET # BLD AUTO: 228 10E9/L (ref 150–450)
POTASSIUM SERPL-SCNC: 4.6 MMOL/L (ref 3.4–5.3)
PROT SERPL-MCNC: 7.7 G/DL (ref 6.8–8.8)
RBC # BLD AUTO: 5.29 10E12/L (ref 4.4–5.9)
SODIUM SERPL-SCNC: 139 MMOL/L (ref 133–144)
WBC # BLD AUTO: 6.1 10E9/L (ref 4–11)

## 2021-05-10 PROCEDURE — 99214 OFFICE O/P EST MOD 30 MIN: CPT | Mod: 95 | Performed by: INTERNAL MEDICINE

## 2021-05-10 PROCEDURE — 80053 COMPREHEN METABOLIC PANEL: CPT | Performed by: PATHOLOGY

## 2021-05-10 PROCEDURE — 36415 COLL VENOUS BLD VENIPUNCTURE: CPT | Performed by: PATHOLOGY

## 2021-05-10 PROCEDURE — 71260 CT THORAX DX C+: CPT | Performed by: RADIOLOGY

## 2021-05-10 PROCEDURE — 999N001193 HC VIDEO/TELEPHONE VISIT; NO CHARGE

## 2021-05-10 PROCEDURE — 74177 CT ABD & PELVIS W/CONTRAST: CPT | Performed by: RADIOLOGY

## 2021-05-10 PROCEDURE — 85025 COMPLETE CBC W/AUTO DIFF WBC: CPT | Performed by: PATHOLOGY

## 2021-05-10 RX ORDER — IOPAMIDOL 755 MG/ML
135 INJECTION, SOLUTION INTRAVASCULAR ONCE
Status: COMPLETED | OUTPATIENT
Start: 2021-05-10 | End: 2021-05-10

## 2021-05-10 RX ADMIN — IOPAMIDOL 135 ML: 755 INJECTION, SOLUTION INTRAVASCULAR at 14:30

## 2021-05-10 NOTE — PROGRESS NOTES
Devin is a 45 year old who is being evaluated via a billable telephone visit.      What phone number would you like to be contacted at? [phone]  How would you like to obtain your AVS? MOLLY Blanton    Phone call duration: 20 minutes    I am seeing Chucho Pattno today in follow-up of gastrointestinal stromal tumor.    His tumor was found incidentally when he had imaging for kidney stone in the summer 2015.  He had a laparoscopic resection of a 13 cm GIST which was of low-grade with positive c-kit staining and arose from his stomach.  He has been free of evidence of disease since.  He had 2 years of adjuvant Gleevec and then elected to discontinue that.  He tells me he currently is feeling great.  He has gained about 17 pounds in the last year but that is plateaued over the last few months.  He is entirely asymptomatic at this point.  He and his wife are both been vaccinated are planning on traveling to [place] this summer.    Over the phone he sounds well.    I personally reviewed his CT scan went over the results with him.  It shows no evidence of recurrence of his disease.  His lab work shows normal electrolytes and renal function.  His bilirubin, albumin and liver enzymes are normal except for marginally elevated ALT.  His blood counts are normal.    Assessment/plan: Resected GIST of the stomach, currently without evidence of disease.  He still has some ongoing risk of recurrence but it is quite small at this point.  After some discussion we decided we would plan on 1 more surveillance visit in about 2 years with a CT scan and lab work and if all that was negative then seems routine surveillance.  He will bring to our attention any worrisome symptoms that develop in the interim.      Total time by me today inclusive of the above visit, review of his imaging and other results, and documentation was approximately 30 minutes.

## 2021-05-10 NOTE — TELEPHONE ENCOUNTER
[facility] cancer clinic patient.   PCP: Dr Cho  Patient states he has his annual CT scan of chest/abdomen with contrast tomorrow at 2:20 pm    Patient [facility] which stated patient did not need to be NPO for the scan.     Patient states in the past when he has gotten this same exact CT scan -  He has always had to be NPO for a few hours before than scan since he is getting contrast.     RN looked online, which multiple sources states to be NPO for 4 hours before CT scan with contrast. Patient thinks that was the amount of time he was NPO during his last CT scan.     RN offered to send a message to the clinic to give him further clarification regarding the NPO status.   Patient declined, stating he will just call the clinic in the morning.     Patient had no further questions.     Dalia Will RN/[facility]          Reason for Disposition    Question about upcoming scheduled test, no triage required and triager able to answer question    Additional Information    Negative: [1] Caller is not with the adult (patient) AND [2] reporting urgent symptoms    Negative: Lab result questions    Negative: Medication questions    Negative: Caller can't be reached by phone    Negative: Caller has already spoken to PCP or another triager    Negative: RN needs further essential information from caller in order to complete triage    Negative: Requesting regular office appointment    Negative: [1] Caller requesting NON-URGENT health information AND [2] PCP's office is the best resource    Negative: Health Information question, no triage required and triager able to answer question    Negative: General information question, no triage required and triager able to answer question    Protocols used: INFORMATION ONLY CALL - NO TRIAGE-[facility]

## 2021-09-12 ENCOUNTER — HEALTH MAINTENANCE LETTER (OUTPATIENT)
Age: 46
End: 2021-09-12

## 2022-01-02 ENCOUNTER — HEALTH MAINTENANCE LETTER (OUTPATIENT)
Age: 47
End: 2022-01-02

## 2022-01-06 ENCOUNTER — TELEPHONE (OUTPATIENT)
Dept: ONCOLOGY | Facility: CLINIC | Age: 47
End: 2022-01-06
Payer: COMMERCIAL

## 2022-01-06 NOTE — TELEPHONE ENCOUNTER
"Subject: Chest pain. Pt is calling us today because he does not want to go to the ED d/t omicron variant exposure in the ED.     Background:   Dx: GIST--Dr. Cho's patient  Has been cancer free x 5 years  Last scan May, 2021--Dr. Cho moved scans to every two years.  States he is \"as close to being cured as they come.\"    Assessment:   Sharp chest pain that coincides with his heartbeat, on and off this morning; not constant, not consistent. Pain does not radiate. No sweating, breathing problems, sob, chest pressure. No URI. Did home covid test and that was negative. Pulse is 67.    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 Sometimes when this flares up pain radiates from his neck to his back.    Describes himself as anxious. When he gets anxious, he sometimes has this chest pain, and when he has the chest pain, it causes him to be anxious.    Pt denies history of cardiac problems. He saw a cardiologist 1 1/2 years ago and had an esophageal ECHO due to chemotherapy he was on: imatinib which can cause heart damage. ECHO was fine and a small PFO was identified but pt reports it is not threatening to him.    This writer inquired about pt having PCP. Pt says he \"quasi\" has a PCP, but there is a clinic that he has used near his home for some things. States that he needs to find a PCP and kind of used [facility] as a PCP when he was sick.    Recommendations:  Explained that we would not be able to see pt for chest pain today.  Advised pt be evaluated today for chest pain.  Advised pt call the PCP and make an appointment today to be evaluated.  If unable to be seen by PCP, pt should go to Urgent Care for an evaluation today.  If sx worsen, pt becomes sob, has breathing problems, diaphoresis, worsening pain, pressure in chest, and/or feels dizzy, shaky, should call 911.    Pt verbalized understanding of these instructions and will call provider to be evaluated today.    Routed to Dr." Greeno and Care team.

## 2022-01-11 ENCOUNTER — OFFICE VISIT (OUTPATIENT)
Dept: FAMILY MEDICINE | Facility: CLINIC | Age: 47
End: 2022-01-11
Payer: COMMERCIAL

## 2022-01-11 ENCOUNTER — LAB (OUTPATIENT)
Dept: LAB | Facility: CLINIC | Age: 47
End: 2022-01-11
Payer: COMMERCIAL

## 2022-01-11 VITALS
HEART RATE: 56 BPM | HEIGHT: 72 IN | BODY MASS INDEX: 31.15 KG/M2 | WEIGHT: 230 LBS | SYSTOLIC BLOOD PRESSURE: 126 MMHG | DIASTOLIC BLOOD PRESSURE: 81 MMHG | OXYGEN SATURATION: 98 % | RESPIRATION RATE: 16 BRPM

## 2022-01-11 DIAGNOSIS — F41.9 ANXIETY: Primary | ICD-10-CM

## 2022-01-11 DIAGNOSIS — E78.00 HIGH CHOLESTEROL: ICD-10-CM

## 2022-01-11 DIAGNOSIS — R07.89 ATYPICAL CHEST PAIN: ICD-10-CM

## 2022-01-11 LAB
ALBUMIN SERPL-MCNC: 3.9 G/DL (ref 3.4–5)
ALP SERPL-CCNC: 60 U/L (ref 40–150)
ALT SERPL W P-5'-P-CCNC: 46 U/L (ref 0–70)
ANION GAP SERPL CALCULATED.3IONS-SCNC: 8 MMOL/L (ref 3–14)
AST SERPL W P-5'-P-CCNC: 18 U/L (ref 0–45)
BILIRUB SERPL-MCNC: 0.5 MG/DL (ref 0.2–1.3)
BUN SERPL-MCNC: 19 MG/DL (ref 7–30)
CALCIUM SERPL-MCNC: 9.2 MG/DL (ref 8.5–10.1)
CHLORIDE BLD-SCNC: 108 MMOL/L (ref 94–109)
CHOLEST SERPL-MCNC: 177 MG/DL
CO2 SERPL-SCNC: 23 MMOL/L (ref 20–32)
CREAT SERPL-MCNC: 0.87 MG/DL (ref 0.66–1.25)
FASTING STATUS PATIENT QL REPORTED: ABNORMAL
GFR SERPL CREATININE-BSD FRML MDRD: >90 ML/MIN/1.73M2
GLUCOSE BLD-MCNC: 113 MG/DL (ref 70–99)
HDLC SERPL-MCNC: 48 MG/DL
LDLC SERPL CALC-MCNC: 96 MG/DL
NONHDLC SERPL-MCNC: 129 MG/DL
POTASSIUM BLD-SCNC: 4.3 MMOL/L (ref 3.4–5.3)
PROT SERPL-MCNC: 7.6 G/DL (ref 6.8–8.8)
SODIUM SERPL-SCNC: 139 MMOL/L (ref 133–144)
TRIGL SERPL-MCNC: 166 MG/DL

## 2022-01-11 PROCEDURE — 80061 LIPID PANEL: CPT | Performed by: PATHOLOGY

## 2022-01-11 PROCEDURE — 93000 ELECTROCARDIOGRAM COMPLETE: CPT | Performed by: FAMILY MEDICINE

## 2022-01-11 PROCEDURE — 80053 COMPREHEN METABOLIC PANEL: CPT | Performed by: PATHOLOGY

## 2022-01-11 PROCEDURE — 99386 PREV VISIT NEW AGE 40-64: CPT | Mod: 25 | Performed by: FAMILY MEDICINE

## 2022-01-11 PROCEDURE — 36415 COLL VENOUS BLD VENIPUNCTURE: CPT | Performed by: PATHOLOGY

## 2022-01-11 ASSESSMENT — PATIENT HEALTH QUESTIONNAIRE - PHQ9
5. POOR APPETITE OR OVEREATING: NOT AT ALL
SUM OF ALL RESPONSES TO PHQ QUESTIONS 1-9: 6

## 2022-01-11 ASSESSMENT — PAIN SCALES - GENERAL: PAINLEVEL: NO PAIN (0)

## 2022-01-11 ASSESSMENT — ANXIETY QUESTIONNAIRES
3. WORRYING TOO MUCH ABOUT DIFFERENT THINGS: MORE THAN HALF THE DAYS
5. BEING SO RESTLESS THAT IT IS HARD TO SIT STILL: NOT AT ALL
6. BECOMING EASILY ANNOYED OR IRRITABLE: NOT AT ALL
1. FEELING NERVOUS, ANXIOUS, OR ON EDGE: MORE THAN HALF THE DAYS
GAD7 TOTAL SCORE: 4
7. FEELING AFRAID AS IF SOMETHING AWFUL MIGHT HAPPEN: NOT AT ALL
IF YOU CHECKED OFF ANY PROBLEMS ON THIS QUESTIONNAIRE, HOW DIFFICULT HAVE THESE PROBLEMS MADE IT FOR YOU TO DO YOUR WORK, TAKE CARE OF THINGS AT HOME, OR GET ALONG WITH OTHER PEOPLE: NOT DIFFICULT AT ALL
2. NOT BEING ABLE TO STOP OR CONTROL WORRYING: NOT AT ALL

## 2022-01-11 ASSESSMENT — MIFFLIN-ST. JEOR: SCORE: 1971.27

## 2022-01-11 NOTE — PROGRESS NOTES
"    Assessment & Plan     Anxiety  He'll start therapy  - Adult Mental Health Referral; Future    High cholesterol  Resume healthier diet/exercise habit  - Lipid panel reflex to direct LDL Fasting; Future  - Comprehensive metabolic panel; Future    Atypical chest pain  EKG within normal limits, prior to resuming exercise will get stress test, given his diagnosis in the past of cancer made incidentally he is very focused on thorough eval   - EKG Performed in Clinic w/ Provider Reading Fee  - Echo Stress Echocardiogram; Future      45 minutes spent on the date of the encounter doing chart review, history and exam, documentation and further activities per the note    BMI:   Estimated body mass index is 31.19 kg/m  as calculated from the following:    Height as of this encounter: 1.829 m (6').    Weight as of this encounter: 104.3 kg (230 lb).     Ashwin Piper MD  [facility] ARISTEO Beltran is a 46 year old who presents for the following health issues    HPI   GIST no sx, next f/u 2023  Declines flu shot, offered  He takes many precautions to avoid covid  BP home: 120/70 or close, within normal range  Family stress, business stress. Both significant right now.  Diet, exercise: discussed, he is aware of wt mgmt clinic. He was , familiar with what it'd take to get in better shape.  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 Open to therapy for anxiety.   Atypical chest pain, usually not connected to exertion. Dad had cad but not till around age seventy. No associated dyspnea nausea sweats.     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     No current facility-administered medications for this visit.     Allergies   Allergen Reactions     Sertraline      Family History   Problem Relation Age of Onset     Diabetes Maternal Grandfather      Family History Negative Father      Blood Disease Mother         polycythemia vera     Social History     Socioeconomic History     Marital status:      Spouse name: Not on file     Number of children: Not on file     Years of education: Not on file     Highest education level: Not on file   Occupational History     Not on file   Tobacco Use     Smoking status: Never Smoker     Smokeless tobacco: Former User     Types: Chew   Substance and Sexual Activity     Alcohol use: No     Alcohol/week: 0.0 standard drinks     Comment: alcohol abuse      Drug use: No     Sexual activity: Not on file   Other Topics Concern     Not on file   Social History Narrative     Not on file     Social Determinants of Health     Financial Resource Strain: Not on file   Food Insecurity: Not on file   Transportation Needs: Not on file   Physical Activity: Not on file   Stress: Not on file   Social Connections: Not on file   Intimate Partner Violence: Not on file   Housing Stability: Not on file                   Past Medical History:   Diagnosis Date     Alcohol abuse, in remission     Sober since 12/03     GIST (gastrointestinal stromal tumor), 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 "     No current facility-administered medications for this visit.     Allergies   Allergen Reactions     Sertraline      Family History   Problem Relation Age of Onset     Diabetes Maternal Grandfather      Family History Negative Father      Blood Disease Mother         polycythemia vera     Social History     Socioeconomic History     Marital status:      Spouse name: Not on file     Number of children: Not on file     Years of education: Not on file     Highest education level: Not on file   Occupational History     Not on file   Tobacco Use     Smoking status: Never Smoker     Smokeless tobacco: Former User     Types: Chew   Substance and Sexual Activity     Alcohol use: No     Alcohol/week: 0.0 standard drinks     Comment: alcohol abuse      Drug use: No     Sexual activity: Not on file   Other Topics Concern     Not on file   Social History Narrative     Not on file     Social Determinants of Health     Financial Resource Strain: Not on file   Food Insecurity: Not on file   Transportation Needs: Not on file   Physical Activity: Not on file   Stress: Not on file   Social Connections: Not on file   Intimate Partner Violence: Not on file   Housing Stability: Not on file             Review of Systems   Ten point ROS otherwise negative      Objective    BP (!) 145/87 (BP Location: Right arm, Patient Position: Sitting, Cuff Size: Adult Regular)   Pulse 56   Resp 16   Ht 1.829 m (6')   Wt 104.3 kg (230 lb)   SpO2 98%   BMI 31.19 kg/m    Body mass index is 31.19 kg/m .  Physical Exam   GENERAL: healthy, alert and no distress  EYES: Eyes grossly normal to inspection, PERRL and conjunctivae and sclerae normal  HENT: ear canals and TM's normal, nose and mouth without ulcers or lesions  NECK: no adenopathy, no asymmetry, masses, or scars and thyroid normal to palpation  RESP: lungs clear to auscultation - no rales, rhonchi or wheezes  CV: regular rate and rhythm, normal S1 S2, no S3 or S4, no murmur, click or  rub, no peripheral edema and peripheral pulses strong  ABDOMEN: soft, nontender, no hepatosplenomegaly, no masses and bowel sounds normal   (male): normal male genitalia without lesions or urethral discharge, no hernia  MS: no gross musculoskeletal defects noted, no edema  SKIN: no suspicious lesions or rashes  NEURO: Normal strength and tone, mentation intact and speech normal  PSYCH: mentation appears normal, affect normal/bright

## 2022-01-11 NOTE — NURSING NOTE
Devin Patton is a 46 year old male patient that presents today in clinic for the following:    Chief Complaint   Patient presents with     Establish Care     Discuss chest discomfort     Physical     The patient's allergies and medications were reviewed as noted. A set of vitals were recorded as noted without incident. The patient does not have any other questions for the provider.    Constance Sher, EMT at 9:21 AM on 1/11/2022

## 2022-01-12 LAB
ATRIAL RATE - MUSE: 58 BPM
DIASTOLIC BLOOD PRESSURE - MUSE: NORMAL MMHG
INTERPRETATION ECG - MUSE: NORMAL
P AXIS - MUSE: 32 DEGREES
PR INTERVAL - MUSE: 184 MS
QRS DURATION - MUSE: 82 MS
QT - MUSE: 396 MS
QTC - MUSE: 388 MS
R AXIS - MUSE: 67 DEGREES
SYSTOLIC BLOOD PRESSURE - MUSE: NORMAL MMHG
T AXIS - MUSE: 57 DEGREES
VENTRICULAR RATE- MUSE: 58 BPM

## 2022-01-12 ASSESSMENT — ANXIETY QUESTIONNAIRES: GAD7 TOTAL SCORE: 4

## 2022-02-03 ENCOUNTER — TELEPHONE (OUTPATIENT)
Dept: FAMILY MEDICINE | Facility: CLINIC | Age: 47
End: 2022-02-03
Payer: COMMERCIAL

## 2022-02-03 NOTE — TELEPHONE ENCOUNTER
Echocardiogram stress test order extended.    Not sure about the physiologist order.  I do not see any other order besides mental health referral for patient anxiety.  This expires in 1 year.      Called and left patient VM of extended order.  Patient may schedule.      Demetrius Montalvo CMA ([facility]) at 3:48 PM on 2/3/2022

## 2022-02-03 NOTE — TELEPHONE ENCOUNTER
[facility]    Phone Message    May a detailed message be left on voicemail: yes     Reason for Call: Other: Pt calling about the orders for the stress test. The orders need to be extended. Please call patient when the orders have been re ordered to reschedule.     A note per site:  Per site - they do not have a physiologist in on 2/10 and patient will need to be rescheduled.  Patient order expires 2/11/2022 and will need to be extended by provider for the reschedule.     Action Taken: Message routed to:  [facility]    Travel Screening: Not Applicable

## 2022-02-07 ENCOUNTER — NURSE TRIAGE (OUTPATIENT)
Dept: NURSING | Facility: CLINIC | Age: 47
End: 2022-02-07
Payer: COMMERCIAL

## 2022-02-08 NOTE — TELEPHONE ENCOUNTER
Devin has a question about Covid exposure    He and his wife have both received Covid vaccinations and boosters.  His wife was exposed to someone who tested Positive for Covid, ~2 days ago.    They have both tested Negative with Home tests  They are traveling to [place] tomorrow at 5 am    Advised per Home Care:  FAQ - DO I STILL NEED TO QUARANTINE AFTER COVID-19 EXPOSURE IF I HAVE RECEIVED THE COVID-19 VACCINE?  * If you are fully vaccinated, you do not need to quarantine after exposure to COVID-19, unless you develop symptoms.  * However, fully vaccinated people should GET TESTED 3 TO 5 DAYS after an exposure to COVID-19.    COVID 19 Nurse Triage Plan/Patient Instructions    Please be aware that novel coronavirus (COVID-19) may be circulating in the community. If you develop symptoms such as fever, cough, or SOB or if you have concerns about the presence of another infection including coronavirus (COVID-19), please contact your health care provider or visit [email or web address].     Disposition/Instructions    Home care recommended. Follow home care protocol based instructions.    Thank you for taking steps to prevent the spread of this virus.  o Limit your contact with others.  o Wear a simple mask to cover your cough.  o Wash your hands well and often.    Resources    [facility]: About COVID-19: [email or web address]    CDC: What to Do If You're Sick: www.cdc.gov/coronavirus/2019-ncov/about/steps-when-sick.html    CDC: Ending Home Isolation: www.cdc.gov/coronavirus/2019-ncov/hcp/disposition-in-home-patients.html     CDC: Caring for Someone: www.cdc.gov/coronavirus/2019-ncov/if-you-are-sick/care-for-someone.html     [facility]: Interim Guidance for Hospital Discharge to Home: www.health.[facility].[place]/diseases/coronavirus/hcp/hospdischarge.pdf    [facility] clinical trials (COVID-19 research studies): clinicalaffairs.[facility]/umn-clinical-trials     Below are the COVID-19 hotlines  at the [place] Department of Health ([facility]). Interpreters are available.   o For health questions: Call 427-554-4443 or 1-235.933.5848 (7 a.m. to 7 p.m.)  o For questions about schools and childcare: Call 671-405-0906 or 1-116.767.5402 (7 a.m. to 7 p.m.)     Sisi Carrero RN  [facility]      Reason for Disposition    [1] No COVID-19 EXPOSURE BUT [2] living with someone who was exposed and who has no symptoms of COVID-19    COVID-19 vaccine, fully vaccinated, and exposure to COVID-19, Frequently Asked Questions (FAQs)    Protocols used: CORONAVIRUS (COVID-19) EXPOSURE-[facility] 8.25.2021, CORONAVIRUS (COVID-19) VACCINE QUESTIONS AND [id number]-YT 8.25.2021

## 2022-05-15 ENCOUNTER — NURSE TRIAGE (OUTPATIENT)
Dept: NURSING | Facility: CLINIC | Age: 47
End: 2022-05-15
Payer: COMMERCIAL

## 2022-05-18 ASSESSMENT — PATIENT HEALTH QUESTIONNAIRE - PHQ9
10. IF YOU CHECKED OFF ANY PROBLEMS, HOW DIFFICULT HAVE THESE PROBLEMS MADE IT FOR YOU TO DO YOUR WORK, TAKE CARE OF THINGS AT HOME, OR GET ALONG WITH OTHER PEOPLE: NOT DIFFICULT AT ALL
SUM OF ALL RESPONSES TO PHQ QUESTIONS 1-9: 2
SUM OF ALL RESPONSES TO PHQ QUESTIONS 1-9: 2

## 2022-05-19 ENCOUNTER — VIRTUAL VISIT (OUTPATIENT)
Dept: FAMILY MEDICINE | Facility: CLINIC | Age: 47
End: 2022-05-19
Payer: COMMERCIAL

## 2022-05-19 DIAGNOSIS — F43.23 ADJUSTMENT DISORDER WITH MIXED ANXIETY AND DEPRESSED MOOD: ICD-10-CM

## 2022-05-19 DIAGNOSIS — C49.A2 MALIGNANT GASTROINTESTINAL STROMAL TUMOR (GIST) OF STOMACH (H): ICD-10-CM

## 2022-05-19 DIAGNOSIS — U07.1 INFECTION DUE TO 2019 NOVEL CORONAVIRUS: Primary | ICD-10-CM

## 2022-05-19 DIAGNOSIS — Z86.718 HISTORY OF BLOOD CLOTS: ICD-10-CM

## 2022-05-19 DIAGNOSIS — Z82.49 FAMILY HISTORY OF BLOOD CLOTS: ICD-10-CM

## 2022-05-19 DIAGNOSIS — I81 PORTAL VEIN THROMBOSIS: ICD-10-CM

## 2022-05-19 DIAGNOSIS — C49.A2 MALIGNANT GASTROINTESTINAL STROMAL TUMOR (GIST) OF STOMACH (H): Primary | ICD-10-CM

## 2022-05-19 DIAGNOSIS — F10.21 ALCOHOL DEPENDENCE IN REMISSION (H): ICD-10-CM

## 2022-05-19 PROCEDURE — 99214 OFFICE O/P EST MOD 30 MIN: CPT | Mod: 95 | Performed by: FAMILY MEDICINE

## 2022-05-19 NOTE — PATIENT INSTRUCTIONS
Instructions for Patients      What are the symptoms of COVID-19?  Symptoms can include fever, cough, shortness of breath, chills, headache, muscle pain sore throat, fatigue, runny or stuffy nose, and loss of taste and smell. Other less common symptoms include nausea, vomiting, or diarrhea (watery stools).    Know when to call 911. Emergency warning signs include:    Trouble breathing or shortness of breath    Pain or pressure in the chest that doesn't go away    Feeling confused like you haven't felt before, or not being able to wake up    Bluish-colored lips or face    How can I take care of myself?  1. Get lots of rest. Drink extra fluids (unless a doctor has told you not to).  2. Take Tylenol (acetaminophen) for fever or pain. If you have liver or kidney problems, ask your family doctor if it's okay to take Tylenol   Adults:   650 mg (two 325 mg pills or tablets) every 4 to 6 hours, or...   1,000 mg (two 500 mg pills or tablets) every 8 hours as needed.  Note: Don't take more than 3,000 mg in one day. Acetaminophen is found in many medicines (both prescribed and over the counter). Read all labels to be sure you don't take too much.  For children, check the Tylenol bottle for the right dose. The dose is based on the child's age or weight.  3. Take over the counter medicines for your symptoms as needed. Talk to your pharmacist.  4. If you have other health problems (like cancer, heart failure, an organ transplant, or severe kidney disease): Call your specialty clinic if you don't feel better in the next 2 days.    These guidelines are for isolating and quarantining before returning to work, school or .     For employers, schools and day cares: This is an official notice for this person s medical guidelines for returning in-person.     For health care sites: The CDC gives different isolation and quarantine guidelines for healthcare sites, please check with these sites before arriving.     How do I  self-isolate?  You isolate when you have symptoms of COVID or a test shows you have COVID, even if you don t have symptoms.     If you DO have symptoms:  o Stay home and away from others  - For at least 5 days after your symptoms started, AND   - You are fever free for 24 hours (with no medicine that reduces fever), AND  - Your other symptoms are better.  o Wear a mask for 10 full days any time you are around others.    If you DON T have symptoms:  o Stay at home and away from others for at least 5 days after your positive test.  o Wear a mask for 10 full days any time you are around others.    How and when do I quarantine?  You quarantine when you may have been exposed to the virus and DON T have symptoms.     Stay home and away from others.     You must quarantine for 5 days after your last contact with a person who has COVID.  o Note: If you are fully vaccinated, you don t need to quarantine. You should still follow the steps below.     Wear a mask for 10 full days any time you re around others.    Get tested at least 5 days after you were exposed, even if you don t have symptoms.     If you start to have symptoms, isolate right away and get tested.    Where can I get more information?    [facility] COVID-19 Resource Hub: [email or web address]     [facility] Quarantine & Isolation: https://www.cdc.gov/coronavirus/2019-ncov/your-health/quarantine-isolation.html     [facility] - What to Do If You're Sick: https://www.cdc.gov/coronavirus/2019-ncov/if-you-are-sick/index.html    [facility] clinical trials (COVID-19 research studies): clinicalaffairs.[facility]/umn-clinical-trials    [place] Department of Health COVID-19 Public Hotline: 1-729.482.8029  Instructions for Patients  {Choose where to send COVID19 patient based on nurse triage or clinical judgement of symptom severity & add additional information if desired (Optional):[id number]}    What are the symptoms of COVID-19?  Symptoms can include fever,  cough, shortness of breath, chills, headache, muscle pain sore throat, fatigue, runny or stuffy nose, and loss of taste and smell. Other less common symptoms include nausea, vomiting, or diarrhea (watery stools).    Know when to call 911. Emergency warning signs include:  Trouble breathing or shortness of breath  Pain or pressure in the chest that doesn't go away  Feeling confused like you haven't felt before, or not being able to wake up  Bluish-colored lips or face    How can I take care of myself?  Get lots of rest. Drink extra fluids (unless a doctor has told you not to).  Take Tylenol (acetaminophen) for fever or pain. If you have liver or kidney problems, ask your family doctor if it's okay to take Tylenol   Adults:   650 mg (two 325 mg pills or tablets) every 4 to 6 hours, or...   1,000 mg (two 500 mg pills or tablets) every 8 hours as needed.  Note: Don't take more than 3,000 mg in one day. Acetaminophen is found in many medicines (both prescribed and over the counter). Read all labels to be sure you don't take too much.  For children, check the Tylenol bottle for the right dose. The dose is based on the child's age or weight.  Take over the counter medicines for your symptoms as needed. Talk to your pharmacist.  If you have other health problems (like cancer, heart failure, an organ transplant, or severe kidney disease): Call your specialty clinic if you don't feel better in the next 2 days.    These guidelines are for isolating and quarantining before returning to work, school or .   For employers, schools and day cares: This is an official notice for this person s medical guidelines for returning in-person.   For health care sites: The CDC gives different isolation and quarantine guidelines for healthcare sites, please check with these sites before arriving.     How do I self-isolate?  You isolate when you have symptoms of COVID or a test shows you have COVID, even if you don t have symptoms.   If  you DO have symptoms:  Stay home and away from others  For at least 5 days after your symptoms started, AND   You are fever free for 24 hours (with no medicine that reduces fever), AND  Your other symptoms are better.  Wear a mask for 10 full days any time you are around others.  If you DON T have symptoms:  Stay at home and away from others for at least 5 days after your positive test.  Wear a mask for 10 full days any time you are around others.    How and when do I quarantine?  You quarantine when you may have been exposed to the virus and DON T have symptoms.   Stay home and away from others.   You must quarantine for 5 days after your last contact with a person who has COVID.  Note: If you are fully vaccinated, you don t need to quarantine. You should still follow the steps below.   Wear a mask for 10 full days any time you re around others.  Get tested at least 5 days after you were exposed, even if you don t have symptoms.   If you start to have symptoms, isolate right away and get tested.    Where can I get more information?  [facility] COVID-19 Resource Hub: [email or web address]   CDC Quarantine & Isolation: https://www.cdc.gov/coronavirus/2019-ncov/your-health/quarantine-isolation.html   CDC - What to Do If You're Sick: https://www.cdc.gov/coronavirus/2019-ncov/if-you-are-sick/index.html  [facility] clinical trials (COVID-19 research studies): clinicalaffairs.[facility]/umn-clinical-trials  [place] Department of Health COVID-19 Public Hotline: 1-317.954.5088

## 2022-05-19 NOTE — PROGRESS NOTES
Chucho is a 46 year old who is being evaluated via a billable video visit.      How would you like to obtain your AVS? MyChart  If the video visit is dropped, the invitation should be resent by: Text to cell phone: [phone]  Will anyone else be joining your video visit? No    Answers for HPI/ROS submitted by the patient on 5/18/2022  If you checked off any problems, how difficult have these problems made it for you to do your work, take care of things at home, or get along with other people?: Not difficult at all  PHQ9 TOTAL SCORE: 2      Video Start Time: 9:12 AM      Assessment & Plan     Infection due to 2019 novel coronavirus  Patient does not qualify for  paxlovid due to  history of gastric malignancy, with large portion of the stomach resected in 2015.   On chemotherapy for 2 years.  Edition personal history of blood clot with a history of portal  vein thrombosis and her family history of blood clots as well.  He also has an elevated BMI of 31  Plan: Applied for medical antibody treatment by the [facility] site.  Patient qualified for monoclonal's.  For the [facility] web site, thank you he was assigned to the [facility].    Addendum   Patient's wife will also be receiving monoclonals.  She is scheduled at the [facility] and patient is requesting to be scheduled at same site.  This MD called and spoke to [facility] staff and staff said I would have to speak to the clinic administrator and they would have her call  me regarding patient request.  I did not receive a call back from clinic administrator. Called patient to inform him I had not heard back from [facility]. When patient's wife call to schedule her appointment, staff was able to schedule them together and cancelled his [place] appointment.    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    Weight as of 1/11/22: 104.3 kg (230 lb).       Patient Instructions     Instructions for Patients      What are the symptoms of COVID-19?  Symptoms can include fever, cough, shortness of breath, chills, headache, muscle pain sore throat, fatigue, runny or stuffy nose, and loss of taste and smell. Other less common symptoms include nausea, vomiting, or diarrhea (watery stools).    Know when to call 911. Emergency warning signs include:    Trouble breathing or shortness of breath    Pain or pressure in the chest that doesn't go away    Feeling confused like you haven't felt before, or not being able to wake up    Bluish-colored lips or face    How can I take care of myself?  1. Get lots of rest. Drink extra fluids (unless a doctor has told you not to).  2. Take Tylenol (acetaminophen) for fever or pain. If you have liver or kidney problems, ask your family doctor if it's okay to take Tylenol   Adults:   650 mg (two 325 mg pills or tablets) every 4 to 6 hours, or...   1,000 mg (two 500 mg pills or tablets) every 8 hours as needed.  Note: Don't take more than 3,000 mg in one day. Acetaminophen is found in many medicines (both prescribed and over the counter). Read all labels to be sure you don't take too much.  For children, check the Tylenol bottle for the right dose. The dose is based on the child's age or weight.  3. Take over the counter medicines for your symptoms as needed. Talk to your pharmacist.  4. If you have other health problems (like cancer, heart failure, an organ transplant, or severe kidney  disease): Call your specialty clinic if you don't feel better in the next 2 days.    These guidelines are for isolating and quarantining before returning to work, school or .     For employers, schools and day cares: This is an official notice for this person s medical guidelines for returning in-person.     For health care sites: The CDC gives different isolation and quarantine guidelines for healthcare sites, please check with these sites before arriving.     How do I self-isolate?  You isolate when you have symptoms of COVID or a test shows you have COVID, even if you don t have symptoms.     If you DO have symptoms:  o Stay home and away from others  - For at least 5 days after your symptoms started, AND   - You are fever free for 24 hours (with no medicine that reduces fever), AND  - Your other symptoms are better.  o Wear a mask for 10 full days any time you are around others.    If you DON T have symptoms:  o Stay at home and away from others for at least 5 days after your positive test.  o Wear a mask for 10 full days any time you are around others.    How and when do I quarantine?  You quarantine when you may have been exposed to the virus and DON T have symptoms.     Stay home and away from others.     You must quarantine for 5 days after your last contact with a person who has COVID.  o Note: If you are fully vaccinated, you don t need to quarantine. You should still follow the steps below.     Wear a mask for 10 full days any time you re around others.    Get tested at least 5 days after you were exposed, even if you don t have symptoms.     If you start to have symptoms, isolate right away and get tested.    Where can I get more information?    [facility] COVID-19 Resource Hub: [email or web address]     CDC Quarantine & Isolation: https://www.cdc.gov/coronavirus/2019-ncov/your-health/quarantine-isolation.html     CDC - What to Do If You're Sick:  https://www.cdc.gov/coronavirus/2019-ncov/if-you-are-sick/index.html    [facility] clinical trials (COVID-19 research studies): clinicalaffairs.[facility]/umn-clinical-trials    [place] Department of Health COVID-19 Public Hotline: 1-738.249.5445  Instructions for Patients      What are the symptoms of COVID-19?  Symptoms can include fever, cough, shortness of breath, chills, headache, muscle pain sore throat, fatigue, runny or stuffy nose, and loss of taste and smell. Other less common symptoms include nausea, vomiting, or diarrhea (watery stools).    Know when to call 911. Emergency warning signs include:  Trouble breathing or shortness of breath  Pain or pressure in the chest that doesn't go away  Feeling confused like you haven't felt before, or not being able to wake up  Bluish-colored lips or face    How can I take care of myself?  Get lots of rest. Drink extra fluids (unless a doctor has told you not to).  Take Tylenol (acetaminophen) for fever or pain. If you have liver or kidney problems, ask your family doctor if it's okay to take Tylenol   Adults:   650 mg (two 325 mg pills or tablets) every 4 to 6 hours, or...   1,000 mg (two 500 mg pills or tablets) every 8 hours as needed.  Note: Don't take more than 3,000 mg in one day. Acetaminophen is found in many medicines (both prescribed and over the counter). Read all labels to be sure you don't take too much.  For children, check the Tylenol bottle for the right dose. The dose is based on the child's age or weight.  Take over the counter medicines for your symptoms as needed. Talk to your pharmacist.  If you have other health problems (like cancer, heart failure, an organ transplant, or severe kidney disease): Call your specialty clinic if you don't feel better in the next 2 days.    These guidelines are for isolating and quarantining before returning to work, school or .   For employers, schools and day cares: This is an official notice for  this person s medical guidelines for returning in-person.   For health care sites: The CDC gives different isolation and quarantine guidelines for healthcare sites, please check with these sites before arriving.     How do I self-isolate?  You isolate when you have symptoms of COVID or a test shows you have COVID, even if you don t have symptoms.   If you DO have symptoms:  Stay home and away from others  For at least 5 days after your symptoms started, AND   You are fever free for 24 hours (with no medicine that reduces fever), AND  Your other symptoms are better.  Wear a mask for 10 full days any time you are around others.  If you DON T have symptoms:  Stay at home and away from others for at least 5 days after your positive test.  Wear a mask for 10 full days any time you are around others.    How and when do I quarantine?  You quarantine when you may have been exposed to the virus and DON T have symptoms.   Stay home and away from others.   You must quarantine for 5 days after your last contact with a person who has COVID.  Note: If you are fully vaccinated, you don t need to quarantine. You should still follow the steps below.   Wear a mask for 10 full days any time you re around others.  Get tested at least 5 days after you were exposed, even if you don t have symptoms.   If you start to have symptoms, isolate right away and get tested.    Where can I get more information?  [facility] COVID-19 Resource Hub: [email or web address]   CDC Quarantine & Isolation: https://www.cdc.gov/coronavirus/2019-ncov/your-health/quarantine-isolation.html   CDC - What to Do If You're Sick: https://www.cdc.gov/coronavirus/2019-ncov/if-you-are-sick/index.html  [facility] clinical trials (COVID-19 research studies): clinicalaffairs.[facility]/umn-clinical-trials  [place] Department of Health COVID-19 Public Hotline: 1-909.252.5902    COVID-19 positive patient.  Encounter for consideration of  medication intervention. Patient does not qualify for a prescription. Full discussion with patient including medication options, risks and benefits. Potential drug interactions reviewed with patient.     Treatment Planned Referral to [facility] for possible monoclonal antibodies and patient qualified for [facility] site       Estimated body mass index is 31.19 kg/m  as calculated from the following:    Height as of 1/11/22: 1.829 m (6').    Weight as of 1/11/22: 104.3 kg (230 lb).  GFR Estimate   Date Value Ref Range Status   01/11/2022 >90 >60 mL/min/1.73m2 Final     Comment:     Effective December 21, 2021 eGFRcr in adults is calculated using the 2021 CKD-EPI creatinine equation which includes age and gender (Jenna et al., NEJM, DOI: 10.1056/CSTCoe0143757)   05/10/2021 >90 >60 mL/min/[1.73_m2] Final     Comment:     Non  GFR Calc  Starting 12/18/2018, serum creatinine based estimated GFR (eGFR) will be   calculated using the Chronic Kidney Disease Epidemiology Collaboration   (CKD-EPI) equation.         No follow-ups on file.    Lyn Larson MD  [facility]    Pedro Rankin is a 46 year old who presents for the following health issues     HPI       COVID-19 Symptom Review  How many days ago did these symptoms start? 5/18/22 5/12/22 -in [place]-antigen test with virtual appointment  [facility] approved antigen test for travel-[employer], [employer] international travel-negative   wife positive 5/17/22, patient onset of symptoms 5/15/22  neg until 5/18/222 with onset of symptoms  Travel to [facility]-5/3/22-5/13/22  fourth trip, no h/o covid, vaccinated with boosters, 6 other people they met at the resort have tested positive for covid as well   Both of their children tested positive covid -after a soccer tournament when parents in [facility]     Patient with history cancer-GIST  -gastrointestinal cancer portion of his stomach was removed  -2015,  On chemo for 2  years   Followed by oncologist Dr. Cho,   Dr. Orantes [place]   BMI 31.9     Fh of blood clots father 77 smoker -2 post surgery clots, and PE, brother-48 non smoker  post ankle surgery in left calf    Patient non smoker for  Clot in vessel that feeds liver    Recovering alcoholic 19 years sober        Are any of the following symptoms significant for you?    New or worsening difficulty breathing? No-pulse oximeter     Worsening cough? Yes, I am coughing up mucus.    Nose congestion, clear, post nasal drainage     Fever or chills? No fever, chills     Temp 99     Headache: YES-sinus headache yesterday, now resolved     Sore throat: no    Chest pain: no    Diarrhea: no    Body aches? No     Fatigue     6 others that they were in contact have covid, couple age 80 -triple bypass and breast cancer survivor- Nov 2021 -light head cold   13 cm tumor outside stomach   What treatments has patient tried? Ibuprofen    Does patient live in a nursing home, group home, or shelter? no  Does patient have a way to get food/medications during quarantined? Yes, I have a friend or family member who can help me.              Vaccinated 3/2021-4/2021, October of 2021, no second yet  Due for booster schedule to be complete in 90 days       Review of Systems    ROS: 10 point ROS neg other than the symptoms noted above in the HPI.      Objective           Vitals:  No vitals were obtained today due to virtual visit.    Physical Exam   GENERAL: Healthy, alert and no distress  EYES: Eyes grossly normal to inspection.  No discharge or erythema, or obvious scleral/conjunctival abnormalities.  RESP: No audible wheeze, cough, or visible cyanosis.  No visible retractions or increased work of breathing.    SKIN: Visible skin clear. No significant rash, abnormal pigmentation or lesions.  NEURO: Cranial nerves grossly intact.  Mentation and speech appropriate for age.  PSYCH: Mentation appears normal, affect normal/bright, judgement and insight intact,  normal speech    No results found for this or any previous visit (from the past 24 hour(s)).          Video-Visit Details    Type of service:  Video Visit    Video End Time:0950    Originating Location (pt. Location): Home    Distant Location (provider location):  [facility]     Platform used for Video Visit: LiveRe     On the day of the encounter, time spend on chart review, patient visit, review of testing, documentation and coordination of care 45  minutes

## 2022-05-23 ENCOUNTER — HOME INFUSION (PRE-WILLOW HOME INFUSION) (OUTPATIENT)
Dept: PHARMACY | Facility: CLINIC | Age: 47
End: 2022-05-23

## 2022-06-14 ENCOUNTER — OFFICE VISIT (OUTPATIENT)
Dept: FAMILY MEDICINE | Facility: CLINIC | Age: 47
End: 2022-06-14
Payer: COMMERCIAL

## 2022-06-14 VITALS
WEIGHT: 242 LBS | HEART RATE: 62 BPM | BODY MASS INDEX: 32.82 KG/M2 | OXYGEN SATURATION: 97 % | DIASTOLIC BLOOD PRESSURE: 77 MMHG | SYSTOLIC BLOOD PRESSURE: 112 MMHG

## 2022-06-14 DIAGNOSIS — L50.9 HIVES: Primary | ICD-10-CM

## 2022-06-14 DIAGNOSIS — K52.9 GASTROENTERITIS: ICD-10-CM

## 2022-06-14 DIAGNOSIS — R55 SYNCOPE, UNSPECIFIED SYNCOPE TYPE: ICD-10-CM

## 2022-06-14 PROCEDURE — 99215 OFFICE O/P EST HI 40 MIN: CPT | Performed by: FAMILY MEDICINE

## 2022-06-14 RX ORDER — TRIAMCINOLONE ACETONIDE 1 MG/G
CREAM TOPICAL
COMMUNITY
Start: 2022-06-10

## 2022-06-14 RX ORDER — EPINEPHRINE 0.3 MG/.3ML
0.3 INJECTION SUBCUTANEOUS
COMMUNITY
Start: 2022-06-13

## 2022-06-14 RX ORDER — PREDNISONE 20 MG/1
TABLET ORAL
COMMUNITY
Start: 2022-06-10

## 2022-06-14 RX ORDER — HYDROXYZINE HYDROCHLORIDE 50 MG/1
TABLET, FILM COATED ORAL
COMMUNITY
Start: 2022-06-10

## 2022-06-14 RX ORDER — FAMOTIDINE 20 MG/1
TABLET, FILM COATED ORAL
COMMUNITY
Start: 2022-06-10

## 2022-06-14 NOTE — NURSING NOTE
Chief Complaint   Patient presents with     Hospital F/U     Pt here to follow up, discuss ER visit for xi Wong CMA, EMT at 11:19 AM on 6/14/2022.

## 2022-06-14 NOTE — PROGRESS NOTES
Assessment & Plan     Hives  Started w/ GI sx. Worse w/ benadryl so maybe allergic to that, but had already. If recur in future, to allergy, we agree.     Gastroenteritis  Likely food borne. Self limited. Probably toxin mediated. Discussed.     Syncope, unspecified syncope type  Likely dehydrated (once in hot shower, once walking, seen in ER after, w/u reviewed).       Review of external notes as documented elsewhere in note  41 minutes spent on the date of the encounter doing chart review, history and exam, documentation and further activities per the note  Lengthy discussion: hives (endpoint: probably from what he had causing GI, to allergy if comes back), GI (probably toxin, steaks sat out), syncope (dehydrated/hot shower-walking during). Ongoing fatigue, let me know if not cleared by July, started w/ GI illness.    BMI:   Estimated body mass index is 32.82 kg/m  as calculated from the following:    Height as of 1/11/22: 1.829 m (6').    Weight as of this encounter: 109.8 kg (242 lb).     Ashwin Piper MD  [facility] [place]    Pedro Rankin is a 46 year old who presents for the following health issues     HPI Here in f/u inpt stay. June 9-10 at [facility] Ev notes rvwd    He'd grilled steaks, put in frig, ate one three days later, in three hours developed violent gastroenteriris vomit abd pain diarrhea, also hives. Went to ER. After got Benadryl for hives entire body became red and swollen. Fainted in shower then. Improved, went home. Went back to ER for ongoing GI sx, fainted in parking lot, had been dehydrated both faints. One xray suggested cancer, ct did not show thqat but the prospect of it triggered ptsd like response that persists.     He had covid mid May but felt well, minimal URI like sx, even went to [place] over memorial day felt great.     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     Lumbago      hx \"disc herniation lumbar with sciatica sx occ RLE\" per pt     Other anxiety states     hx panic attacks     Tobacco use disorder      Past Surgical History:   Procedure Laterality Date     ZZC NONSPECIFIC PROCEDURE      wisdome teeth extracted     Current Outpatient Medications   Medication     EPINEPHrine (ANY BX GENERIC EQUIV) 0.3 MG/0.3ML injection 2-pack     famotidine (PEPCID) 20 MG tablet     hydrOXYzine (ATARAX) 50 MG tablet     predniSONE (DELTASONE) 20 MG tablet     triamcinolone (KENALOG) 0.1 % external cream     No current facility-administered medications for this visit.     Allergies   Allergen Reactions     Benadryl Allergy Dermatitis, Dizziness, Hives, Itching and Swelling     Benadryl Itch Stopping Hives     Sertraline            Review of Systems         Objective    /77 (BP Location: Right arm, Patient Position: Sitting, Cuff Size: Adult Large)   Pulse 62   Wt 109.8 kg (242 lb)   SpO2 97%   BMI 32.82 kg/m    Body mass index is 32.82 kg/m .  Physical Exam   GENERAL: healthy, alert and no distress  NECK: no adenopathy, no asymmetry, masses, or scars and thyroid normal to palpation  RESP: lungs clear to auscultation - no rales, rhonchi or wheezes  CV: regular rate and rhythm, normal S1 S2, no S3 or S4, no murmur, click or rub, no peripheral edema and peripheral pulses strong  ABDOMEN: soft, nontender, no hepatosplenomegaly, no masses and bowel sounds normal  MS: no gross musculoskeletal defects noted, no edema  Skin clear                " 702/EMS

## 2022-08-27 NOTE — PROGRESS NOTES
This is a recent snapshot of the patient's [facility] medical record.  For current drug dose and complete information and questions, call [phone] or In Basket pool, fv home infusion ([id number])  CSN Number:  [id number]

## 2022-11-19 ENCOUNTER — HEALTH MAINTENANCE LETTER (OUTPATIENT)
Age: 47
End: 2022-11-19

## 2022-12-14 ENCOUNTER — MYC MEDICAL ADVICE (OUTPATIENT)
Dept: FAMILY MEDICINE | Facility: CLINIC | Age: 47
End: 2022-12-14

## 2022-12-14 NOTE — TELEPHONE ENCOUNTER
It looks like I ordered stress echo January 11 2022, we can use that order to set up a test  If having symptoms that are new or different than those he had at the time I ordered it, he should be seen quickly, perhaps we have open spot in [facility] tmrw, we'd need more detail on his current symptoms he is worried about

## 2022-12-22 ENCOUNTER — HOSPITAL ENCOUNTER (OUTPATIENT)
Dept: CARDIOLOGY | Facility: CLINIC | Age: 47
Discharge: HOME | End: 2022-12-22
Attending: FAMILY MEDICINE | Admitting: FAMILY MEDICINE
Payer: COMMERCIAL

## 2022-12-22 DIAGNOSIS — R07.89 ATYPICAL CHEST PAIN: ICD-10-CM

## 2022-12-22 PROCEDURE — 93325 DOPPLER ECHO COLOR FLOW MAPG: CPT | Mod: 26 | Performed by: INTERNAL MEDICINE

## 2022-12-22 PROCEDURE — 93321 DOPPLER ECHO F-UP/LMTD STD: CPT | Mod: 26 | Performed by: INTERNAL MEDICINE

## 2022-12-22 PROCEDURE — 255N000002 HC RX 255 OP 636: Performed by: INTERNAL MEDICINE

## 2022-12-22 PROCEDURE — C8928 TTE W OR W/O FOL W/CON,STRES: HCPCS

## 2022-12-22 PROCEDURE — 93350 STRESS TTE ONLY: CPT | Mod: 26 | Performed by: INTERNAL MEDICINE

## 2022-12-22 PROCEDURE — 93018 CV STRESS TEST I&R ONLY: CPT | Performed by: INTERNAL MEDICINE

## 2022-12-22 PROCEDURE — 93321 DOPPLER ECHO F-UP/LMTD STD: CPT | Mod: TC

## 2022-12-22 PROCEDURE — 93016 CV STRESS TEST SUPVJ ONLY: CPT | Performed by: INTERNAL MEDICINE

## 2022-12-22 RX ADMIN — PERFLUTREN 5 ML: 6.52 INJECTION, SUSPENSION INTRAVENOUS at 10:08

## 2023-02-09 ENCOUNTER — OFFICE VISIT (OUTPATIENT)
Dept: FAMILY MEDICINE | Facility: CLINIC | Age: 48
End: 2023-02-09
Payer: COMMERCIAL

## 2023-02-09 VITALS
HEART RATE: 64 BPM | OXYGEN SATURATION: 98 % | DIASTOLIC BLOOD PRESSURE: 87 MMHG | BODY MASS INDEX: 32.5 KG/M2 | SYSTOLIC BLOOD PRESSURE: 132 MMHG | WEIGHT: 239.6 LBS

## 2023-02-09 DIAGNOSIS — Z00.00 HEALTH CARE MAINTENANCE: Primary | ICD-10-CM

## 2023-02-09 PROCEDURE — 90471 IMMUNIZATION ADMIN: CPT | Performed by: FAMILY MEDICINE

## 2023-02-09 PROCEDURE — 99396 PREV VISIT EST AGE 40-64: CPT | Mod: 25 | Performed by: FAMILY MEDICINE

## 2023-02-09 PROCEDURE — 90691 TYPHOID VACCINE IM: CPT | Performed by: FAMILY MEDICINE

## 2023-02-09 ASSESSMENT — ENCOUNTER SYMPTOMS
TROUBLE SWALLOWING: 0
SORE THROAT: 0
HOARSE VOICE: 0
TASTE DISTURBANCE: 0
SMELL DISTURBANCE: 0
SINUS PAIN: 0
SINUS CONGESTION: 1
NECK MASS: 0

## 2023-02-09 NOTE — NURSING NOTE
Devin Patton is a 47 year old male that presents in clinic today for the following:     Chief Complaint   Patient presents with     Physical     Pt here for annual physical       The patient's allergies and medications were reviewed. The patient's vitals were obtained without incident. The patient does not have any other questions for the provider.     Robin Dang, EMT at 12:43 PM on 2/9/2023.  Primary Care Clinic: [phone]

## 2023-02-09 NOTE — PROGRESS NOTES
"  Assessment & Plan     Health care maintenance  He's sure dtap/hep b 3 shots utd, he'll double check home records.   - TYPHOID VACCINE, IM  - Adult GI  Referral - Procedure Only; Future  - Lipid panel reflex to direct LDL Fasting; Future  He'll redouble healthy diet/exercise efforts    35 minutes spent on the date of the encounter doing chart review, history and exam, documentation and further activities per the note    Ashwin Piper MD  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     Lumbago      hx \"disc herniation lumbar with sciatica sx occ RLE\" per pt     Other anxiety states     hx panic attacks     Tobacco use disorder      Past Surgical History:   Procedure Laterality Date     ZZC NONSPECIFIC PROCEDURE      wisdome teeth extracted     Current Outpatient Medications   Medication     EPINEPHrine (ANY BX GENERIC EQUIV) 0.3 MG/0.3ML injection 2-pack     famotidine (PEPCID) 20 MG tablet     hydrOXYzine (ATARAX) 50 MG tablet     predniSONE (DELTASONE) 20 MG tablet     triamcinolone (KENALOG) 0.1 % external cream     No current facility-administered medications for this visit.     Allergies   Allergen Reactions     Benadryl Allergy Dermatitis, Dizziness, Hives, Itching and Swelling     Benadryl Itch Stopping Hives     Sertraline      Family History   Problem Relation Age of Onset     Diabetes Maternal Grandfather      Family History Negative " Father      Blood Disease Mother         polycythemia vera     Social History     Socioeconomic History     Marital status:      Spouse name: Not on file     Number of children: Not on file     Years of education: Not on file     Highest education level: Not on file   Occupational History     Not on file   Tobacco Use     Smoking status: Never     Smokeless tobacco: Former     Types: Chew   Substance and Sexual Activity     Alcohol use: No     Alcohol/week: 0.0 standard drinks     Comment: alcohol abuse      Drug use: No     Sexual activity: Not on file   Other Topics Concern     Not on file   Social History Narrative     Not on file     Social Determinants of Health     Financial Resource Strain: Not on file   Food Insecurity: Not on file   Transportation Needs: Not on file   Physical Activity: Not on file   Stress: Not on file   Social Connections: Not on file   Intimate Partner Violence: Not on file   Housing Stability: Not on file           Review of Systems   Answers for HPI/ROS submitted by the patient on 2/9/2023  General Symptoms: No  Skin Symptoms: No  HENT Symptoms: Yes  EYE SYMPTOMS: No  HEART SYMPTOMS: No  LUNG SYMPTOMS: No  INTESTINAL SYMPTOMS: No  URINARY SYMPTOMS: No  REPRODUCTIVE SYMPTOMS: No  SKELETAL SYMPTOMS: No  BLOOD SYMPTOMS: No  NERVOUS SYSTEM SYMPTOMS: No  MENTAL HEALTH SYMPTOMS: No  Ear pain: No  Ear discharge: No  Hearing loss: No  Tinnitus: No  Nosebleeds: No  Congestion: Yes  Sinus pain: No  Trouble swallowing: No   Voice hoarseness: No  Mouth sores: No  Sore throat: No  Tooth pain: No  Gum tenderness: Yes  Bleeding gums: Yes  Change in taste: No  Change in sense of smell: No  Dry mouth: No  Hearing aid used: No  Neck lump: No         Objective    /87 (BP Location: Right arm, Patient Position: Sitting, Cuff Size: Adult Large)   Pulse 64   Wt 108.7 kg (239 lb 9.6 oz)   SpO2 98%   BMI 32.50 kg/m    Body mass index is 32.5 kg/m .  Physical Exam   GENERAL: healthy, alert and  no distress  EYES: Eyes grossly normal to inspection, PERRL and conjunctivae and sclerae normal  HENT: ear canals and TM's normal, nose and mouth without ulcers or lesions  NECK: no adenopathy, no asymmetry, masses, or scars and thyroid normal to palpation  RESP: lungs clear to auscultation - no rales, rhonchi or wheezes  CV: regular rate and rhythm, normal S1 S2, no S3 or S4, no murmur, click or rub, no peripheral edema and peripheral pulses strong  ABDOMEN: soft, nontender, no hepatosplenomegaly, no masses and bowel sounds normal   (male): normal male genitalia without lesions or urethral discharge, no hernia  MS: no gross musculoskeletal defects noted, no edema  SKIN: no suspicious lesions or rashes  NEURO: Normal strength and tone, mentation intact and speech normal  PSYCH: mentation appears normal, affect normal/bright

## 2023-04-27 ENCOUNTER — TELEPHONE (OUTPATIENT)
Dept: GASTROENTEROLOGY | Facility: CLINIC | Age: 48
End: 2023-04-27
Payer: COMMERCIAL

## 2023-04-27 ENCOUNTER — HOSPITAL ENCOUNTER (OUTPATIENT)
Facility: AMBULATORY SURGERY CENTER | Age: 48
End: 2023-04-27
Attending: SPECIALIST | Admitting: SPECIALIST
Payer: COMMERCIAL

## 2023-04-27 NOTE — TELEPHONE ENCOUNTER
Screening Questions  BLUE  KIND OF PREP RED  LOCATION [review exclusion criteria] GREEN  SEDATION TYPE        y Are you active on mychart?       Ofstedal Ordering/Referring Provider?        PO What type of coverage do you have?      n Have you had a positive covid test in the last 14 days?     32.8 1. BMI  [BMI 40+ - review exclusion criteria& smart-phrase document]    y  2. Are you able to give consent for your medical care? [IF NO,RN REVIEW]          n  3. Are you taking any prescription pain medications on a routine schedule   (ex narcotics: oxycodone, roxicodone, oxycontin,  and percocet)? [RN Review]        n  3a. EXTENDED PREP What kind of prescription?     n 4. Do you have any chemical dependencies such as alcohol, street drugs, or methadone?        **If yes 3- 5 , please schedule with MAC sedation.**          IF YES TO ANY 6 - 10 - HOSPITAL SETTING ONLY.     n 6.   Do you need assistance transferring?     n 7.   Have you had a heart or lung transplant?    n 8.   Are you currently on dialysis?   n 9.   Do you use daily home oxygen?   n 10. Do you take nitroglycerin?   10a. n If yes, how often?     11. [FEMALES]   Are you currently pregnant?    11a.  If yes, how many weeks? [ Greater than 12 weeks, OR NEEDED]    n 12. Do you have Pulmonary Hypertension? *NEED PAC APPT AT [facility] w/ MAC*     n 13. [review exclusion criteria]  Do you have any implantable devices in your body (pacemaker, defib, LVAD)?    n 14. In the past 6 months, have you had any heart related issues including cardiomyopathy or heart attack?     14a. n If yes, did it require cardiac stenting if so when?     n 15. Have you had a stroke or Transient ischemic attack (TIA - aka  mini stroke ) within 6 months?      n 16. Do you have mod to severe Obstructive Sleep Apnea?  [Hospital only]    n 17. Do you have SEVERE AND UNCONTROLLED asthma? *NEED PAC APPT AT [facility] w/MAC*     18. Are you currently taking any blood thinners?     18a. No. Continue to  "19.   18b.     n 19. Do you take the medication Phentermine?    19a. If yes, \"Hold for 7 days before procedure.  Please consult your prescribing provider if you have questions about holding this medication.\"     n  20. Do you have chronic kidney disease?      n  21. Do you have a diagnosis of diabetes?     n  22. On a regular basis do you go 3-5 days between bowel movements?      23. Preferred LOCAL Pharmacy for Pre Prescription    [ LIST ONLY ONE PHARMACY]     [facility] [id number] [address] [place] [address] [address]        - CLOSING REMINDERS -    Informed patient they will need an adult    Cannot take any type of public or medical transportation alone    Conscious Sedation- Needs  for 6 hours after the procedure       MAC/General-Needs  for 24 hours after procedure    Pre-Procedure Covid test to be completed [[facility] PCR Testing Required]    Confirmed Nurse will call to complete assessment       - SCHEDULING DETAILS -  n Hospital Setting Required? If yes, what is the exclusion?:    Aureliano  Surgeon    6/27  Date of Procedure  Lower Endoscopy [Colonoscopy]  Type of Procedure Scheduled  [facility] [place]   MIRALAX GATORADE WITHOUT MAGNEISUM CITRATE Which Colonoscopy Prep was Sent?     CS Sedation Type     n PAC / Pre-op Required                 "

## 2023-05-15 ENCOUNTER — LAB (OUTPATIENT)
Dept: LAB | Facility: CLINIC | Age: 48
End: 2023-05-15
Payer: COMMERCIAL

## 2023-05-15 ENCOUNTER — ONCOLOGY VISIT (OUTPATIENT)
Dept: ONCOLOGY | Facility: CLINIC | Age: 48
End: 2023-05-15
Attending: INTERNAL MEDICINE
Payer: COMMERCIAL

## 2023-05-15 ENCOUNTER — ANCILLARY PROCEDURE (OUTPATIENT)
Dept: CT IMAGING | Facility: CLINIC | Age: 48
End: 2023-05-15
Attending: INTERNAL MEDICINE
Payer: COMMERCIAL

## 2023-05-15 VITALS
HEART RATE: 67 BPM | WEIGHT: 244.8 LBS | TEMPERATURE: 97.8 F | DIASTOLIC BLOOD PRESSURE: 81 MMHG | SYSTOLIC BLOOD PRESSURE: 125 MMHG | RESPIRATION RATE: 16 BRPM | OXYGEN SATURATION: 98 % | BODY MASS INDEX: 33.2 KG/M2

## 2023-05-15 DIAGNOSIS — C49.A2 MALIGNANT GASTROINTESTINAL STROMAL TUMOR (GIST) OF STOMACH (H): Primary | ICD-10-CM

## 2023-05-15 DIAGNOSIS — Z00.00 HEALTH CARE MAINTENANCE: ICD-10-CM

## 2023-05-15 DIAGNOSIS — C49.A2 MALIGNANT GASTROINTESTINAL STROMAL TUMOR (GIST) OF STOMACH (H): ICD-10-CM

## 2023-05-15 LAB
ALBUMIN SERPL BCG-MCNC: 4.2 G/DL (ref 3.5–5.2)
ALP SERPL-CCNC: 58 U/L (ref 40–129)
ALT SERPL W P-5'-P-CCNC: 33 U/L (ref 10–50)
ANION GAP SERPL CALCULATED.3IONS-SCNC: 8 MMOL/L (ref 7–15)
AST SERPL W P-5'-P-CCNC: 24 U/L (ref 10–50)
BASOPHILS # BLD AUTO: 0 10E3/UL (ref 0–0.2)
BASOPHILS NFR BLD AUTO: 0 %
BILIRUB SERPL-MCNC: 0.5 MG/DL
BUN SERPL-MCNC: 14.2 MG/DL (ref 6–20)
CALCIUM SERPL-MCNC: 9.5 MG/DL (ref 8.6–10)
CHLORIDE SERPL-SCNC: 106 MMOL/L (ref 98–107)
CHOLEST SERPL-MCNC: 161 MG/DL
CREAT SERPL-MCNC: 0.97 MG/DL (ref 0.67–1.17)
DEPRECATED HCO3 PLAS-SCNC: 25 MMOL/L (ref 22–29)
EOSINOPHIL # BLD AUTO: 0.1 10E3/UL (ref 0–0.7)
EOSINOPHIL NFR BLD AUTO: 2 %
ERYTHROCYTE [DISTWIDTH] IN BLOOD BY AUTOMATED COUNT: 13 % (ref 10–15)
GFR SERPL CREATININE-BSD FRML MDRD: >90 ML/MIN/1.73M2
GLUCOSE SERPL-MCNC: 102 MG/DL (ref 70–99)
HCT VFR BLD AUTO: 46.5 % (ref 40–53)
HDLC SERPL-MCNC: 41 MG/DL
HGB BLD-MCNC: 16.2 G/DL (ref 13.3–17.7)
IMM GRANULOCYTES # BLD: 0 10E3/UL
IMM GRANULOCYTES NFR BLD: 0 %
LDLC SERPL CALC-MCNC: 69 MG/DL
LYMPHOCYTES # BLD AUTO: 2.4 10E3/UL (ref 0.8–5.3)
LYMPHOCYTES NFR BLD AUTO: 40 %
MCH RBC QN AUTO: 30.4 PG (ref 26.5–33)
MCHC RBC AUTO-ENTMCNC: 34.8 G/DL (ref 31.5–36.5)
MCV RBC AUTO: 87 FL (ref 78–100)
MONOCYTES # BLD AUTO: 0.4 10E3/UL (ref 0–1.3)
MONOCYTES NFR BLD AUTO: 6 %
NEUTROPHILS # BLD AUTO: 3.1 10E3/UL (ref 1.6–8.3)
NEUTROPHILS NFR BLD AUTO: 52 %
NONHDLC SERPL-MCNC: 120 MG/DL
NRBC # BLD AUTO: 0 10E3/UL
NRBC BLD AUTO-RTO: 0 /100
PLATELET # BLD AUTO: 253 10E3/UL (ref 150–450)
POTASSIUM SERPL-SCNC: 5 MMOL/L (ref 3.4–5.3)
PROT SERPL-MCNC: 7.3 G/DL (ref 6.4–8.3)
RBC # BLD AUTO: 5.33 10E6/UL (ref 4.4–5.9)
SODIUM SERPL-SCNC: 139 MMOL/L (ref 136–145)
TRIGL SERPL-MCNC: 254 MG/DL
WBC # BLD AUTO: 6.1 10E3/UL (ref 4–11)

## 2023-05-15 PROCEDURE — G0463 HOSPITAL OUTPT CLINIC VISIT: HCPCS | Performed by: INTERNAL MEDICINE

## 2023-05-15 PROCEDURE — 36415 COLL VENOUS BLD VENIPUNCTURE: CPT | Performed by: PATHOLOGY

## 2023-05-15 PROCEDURE — 71270 CT THORAX DX C-/C+: CPT | Performed by: RADIOLOGY

## 2023-05-15 PROCEDURE — 74178 CT ABD&PLV WO CNTR FLWD CNTR: CPT | Performed by: RADIOLOGY

## 2023-05-15 PROCEDURE — 99214 OFFICE O/P EST MOD 30 MIN: CPT | Performed by: INTERNAL MEDICINE

## 2023-05-15 PROCEDURE — 85025 COMPLETE CBC W/AUTO DIFF WBC: CPT | Performed by: PATHOLOGY

## 2023-05-15 PROCEDURE — 80061 LIPID PANEL: CPT | Performed by: PATHOLOGY

## 2023-05-15 PROCEDURE — 80053 COMPREHEN METABOLIC PANEL: CPT | Performed by: PATHOLOGY

## 2023-05-15 RX ORDER — IOPAMIDOL 755 MG/ML
122 INJECTION, SOLUTION INTRAVASCULAR ONCE
Status: COMPLETED | OUTPATIENT
Start: 2023-05-15 | End: 2023-05-15

## 2023-05-15 RX ADMIN — IOPAMIDOL 122 ML: 755 INJECTION, SOLUTION INTRAVASCULAR at 11:53

## 2023-05-15 ASSESSMENT — PAIN SCALES - GENERAL: PAINLEVEL: NO PAIN (0)

## 2023-05-15 NOTE — LETTER
5/15/2023         RE: Devin Patton  [address] [address] [facility] [address]        Dear Colleague,    Thank you for referring your patient, Devin Patton, to the [facility]. Please see a copy of my visit note below.    Chucho Patton returns today in follow-up of resected gastrointestinal stromal tumor of his stomach.    He is 47 years old and his cancer was found incidentally during imaging for kidney stone in the summer 2015.  His tumor was resected laparoscopically, was 13 cm in maximal diameter with a low-grade and positive c-kit staining.  He had 2 years of adjuvant Gleevec and then chose to discontinue further adjuvant therapy.  He has been free of evidence of disease since.  He tells me that since we saw him last he has been feeling quite well with no significant new medical problems.  He has gained quite a bit of weight due to decreased activity during the COVID pandemic and has a plan to work hard on taking some of that weight off this summer.  He continues to work as a .  He and his wife are in the process of building a home in [facility].    On physical exam he is alert and robustly healthy appearing.  His vital signs are unremarkable.  He has no icterus.  He has no adenopathy palpable in the neck or supraclavicular spaces.  His lungs are clear.  His heart rate and rhythm are regular without audible murmur.  His abdomen is soft and nontender without palpable mass organomegaly.  He has no peripheral edema.  His speech is fluent and his cranial nerves are grossly intact.    I personally reviewed his CT scan for which I do not yet have the radiologist interpretation.  To my review I see no evidence of recurrence of his cancer or other significant abnormality.    His labs show normal electrolytes and renal function.  His bilirubin, albumin and liver enzymes are normal.  His blood counts are normal.    Assessment/plan: Resected low-grade GIST of the stomach  now 7 years out without evidence of disease.  His risk at this point is low enough that I do not think he warrants routine surveillance.  We spent some time today reviewing symptoms that should be brought to medical attention and he and his wife are comfortable with following up on an as-needed basis.      Again, thank you for allowing me to participate in the care of your patient.        Sincerely,        Benjamin Cho MD

## 2023-05-15 NOTE — LETTER
5/15/2023         RE: Devin Patton  [address] [address] [facility] [address]      Chucho Patton returns today in follow-up of resected gastrointestinal stromal tumor of his stomach.    He is 47 years old and his cancer was found incidentally during imaging for kidney stone in the summer 2015.  His tumor was resected laparoscopically, was 13 cm in maximal diameter with a low-grade and positive c-kit staining.  He had 2 years of adjuvant Gleevec and then chose to discontinue further adjuvant therapy.  He has been free of evidence of disease since.  He tells me that since we saw him last he has been feeling quite well with no significant new medical problems.  He has gained quite a bit of weight due to decreased activity during the COVID pandemic and has a plan to work hard on taking some of that weight off this summer.  He continues to work as a .  He and his wife are in the process of building a home in [place] [facility].    On physical exam he is alert and robustly healthy appearing.  His vital signs are unremarkable.  He has no icterus.  He has no adenopathy palpable in the neck or supraclavicular spaces.  His lungs are clear.  His heart rate and rhythm are regular without audible murmur.  His abdomen is soft and nontender without palpable mass organomegaly.  He has no peripheral edema.  His speech is fluent and his cranial nerves are grossly intact.    I personally reviewed his CT scan for which I do not yet have the radiologist interpretation.  To my review I see no evidence of recurrence of his cancer or other significant abnormality.    His labs show normal electrolytes and renal function.  His bilirubin, albumin and liver enzymes are normal.  His blood counts are normal.    Assessment/plan: Resected low-grade GIST of the stomach now 7 years out without evidence of disease.  His risk at this point is low enough that I do not think he warrants routine surveillance.  We spent some time today  reviewing symptoms that should be brought to medical attention and he and his wife are comfortable with following up on an as-needed basis.        Benjamin Cho MD

## 2023-05-15 NOTE — NURSING NOTE
Oncology Rooming Note    May 15, 2023 2:29 PM   Devin Patton is a 47 year old male who presents for:    Chief Complaint   Patient presents with     Oncology Clinic Visit     GIST     Initial Vitals: /81   Pulse 67   Temp 97.8  F (36.6  C) (Oral)   Resp 16   Wt 111 kg (244 lb 12.8 oz)   SpO2 98%   BMI 33.20 kg/m   Estimated body mass index is 33.2 kg/m  as calculated from the following:    Height as of 1/11/22: 1.829 m (6').    Weight as of this encounter: 111 kg (244 lb 12.8 oz). Body surface area is 2.37 meters squared.  No Pain (0) Comment: Data Unavailable   No LMP for male patient.  Allergies reviewed: Yes  Medications reviewed: Yes    Medications: Medication refills not needed today.  Pharmacy name entered into [facility]: [facility] [id number] [place] [address] [address]    Clinical concerns: NONE      Vandana Burns

## 2023-05-16 NOTE — PROGRESS NOTES
Chucho Patton returns today in follow-up of resected gastrointestinal stromal tumor of his stomach.    He is 47 years old and his cancer was found incidentally during imaging for kidney stone in the summer 2015.  His tumor was resected laparoscopically, was 13 cm in maximal diameter with a low-grade and positive c-kit staining.  He had 2 years of adjuvant Gleevec and then chose to discontinue further adjuvant therapy.  He has been free of evidence of disease since.  He tells me that since we saw him last he has been feeling quite well with no significant new medical problems.  He has gained quite a bit of weight due to decreased activity during the COVID pandemic and has a plan to work hard on taking some of that weight off this summer.  He continues to work as a .  He and his wife are in the process of building a home in [facility].    On physical exam he is alert and robustly healthy appearing.  His vital signs are unremarkable.  He has no icterus.  He has no adenopathy palpable in the neck or supraclavicular spaces.  His lungs are clear.  His heart rate and rhythm are regular without audible murmur.  His abdomen is soft and nontender without palpable mass organomegaly.  He has no peripheral edema.  His speech is fluent and his cranial nerves are grossly intact.    I personally reviewed his CT scan for which I do not yet have the radiologist interpretation.  To my review I see no evidence of recurrence of his cancer or other significant abnormality.    His labs show normal electrolytes and renal function.  His bilirubin, albumin and liver enzymes are normal.  His blood counts are normal.    Assessment/plan: Resected low-grade GIST of the stomach now 7 years out without evidence of disease.  His risk at this point is low enough that I do not think he warrants routine surveillance.  We spent some time today reviewing symptoms that should be brought to medical attention and he and his wife are  comfortable with following up on an as-needed basis.

## 2023-06-13 ENCOUNTER — TELEPHONE (OUTPATIENT)
Dept: GASTROENTEROLOGY | Facility: CLINIC | Age: 48
End: 2023-06-13

## 2023-06-13 NOTE — TELEPHONE ENCOUNTER
Pre assessment questions completed for upcoming Colonoscopy  procedure scheduled on 06.27.2023    COVID policy reviewed.     Reviewed procedural arrival time 0915 and facility location [facility]; [address], [place] [address]    Designated  policy reviewed. Instructed to have someone stay 6 hours post procedure.     NSAIDs? No    Anticoagulation/blood thinners? No    Electronic implanted devices? No    Diabetic? No.    Reviewed procedure prep instructions.     Patient verbalized understanding and had no questions or concerns at this time.    Jayla Han RN  Endoscopy Procedure Pre Assessment RN

## 2023-06-13 NOTE — TELEPHONE ENCOUNTER
Patient scheduled for Colonoscopy  on 6/27/23.     Discuss Covid policy.     Pre op exam needed? N/A    Arrival time: 0915. Procedure time 1000    Facility location: [facility]; [address], [place] [address]    Sedation type: Conscious sedation     NSAIDs? No    Anticoagulations? No    Electronic implanted devices? No    Diabetic? No    Indication for procedure: screening     Bowel prep recommendation: Miralax prep without magnesium citrate    Prep instructions sent via [employer]. Resent with updated bowel prep times.     Pre visit planning completed.    Jayla Caldera RN  Endoscopy Procedure Pre Assessment RN

## 2023-06-26 ENCOUNTER — TELEPHONE (OUTPATIENT)
Dept: GASTROENTEROLOGY | Facility: CLINIC | Age: 48
End: 2023-06-26
Payer: COMMERCIAL

## 2023-06-26 NOTE — TELEPHONE ENCOUNTER
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      Prior to reschedule please review:    Ordering Provider: Ofstedal    Sedation per order: moderate    Does patient have any ASC Exclusions, please identify?: n      Notes on Cancelled Procedure:    Procedure: Lower Endoscopy [Colonoscopy]     Date: 6/27    Location: [facility]; [address], [place] [address]    Surgeon: Jennifer      Rescheduled: Yes    Procedure: Lower Endoscopy [Colonoscopy]     Date: 8/30    Location: [facility]; [address], [place] [address]    Surgeon: Johny    Sedation Level Scheduled  modereate,  Reason for Sedation Level on block    Prep/Instructions updated and sent: y     Send In - basket message to Panc - Clarence Pool if EUS  procedure is canceled or rescheduled: [ N/A, YES or NO] n/a

## 2023-06-26 NOTE — TELEPHONE ENCOUNTER
Procedure rescheduled to 8/30/23    Jayla Caldrea RN  Endoscopy Procedure Pre Assessment RN  [phone] option 4

## 2023-08-15 NOTE — TELEPHONE ENCOUNTER
Rescheduled Colonoscopy 08.30.2023    Attempted to contact patient in order to complete pre assessment questions.     No answer. Left message to return call to [phone] option 4      Procedure details:    Patient scheduled for Colonoscopy  on 08.30.2023.     Arrival time: 0745. Procedure time 0830    Pre op exam needed? N/A    Facility location: [facility] [place]; [address], [place] [address]    Sedation type: Conscious sedation     Indication for procedure: Health care maintenance       Chart review:     Electronic implanted devices? No    Diabetic? No    Diabetic medication HOLDING recommendations: (if applicable)  Oral diabetic medications: N/A  Diabetic injectables: N/A  Insulin: N/A      Medication review:    Anticoagulants? No    NSAIDS? No NSAID medications per patient's medication list.  RN will verify with pre-assessment call.    Other medication HOLDING recommendations:  N/A      Prep for procedure:     Bowel prep recommendation: Miralax prep without magnesium citrate   Due to: standard bowel prep.    Prep instructions sent via [employer].       Jayla Han RN  Endoscopy Procedure Pre Assessment RN

## 2023-08-21 NOTE — TELEPHONE ENCOUNTER
Arrival time change:    Arrival: 0805  Procedure: 0850        Second call attempt to complete pre assessment.     No answer.  Left message to return call to [phone] #4 within 24 hours or risk procedure being cancelled.     Additional information needed?  N/A      Lisa Jensen RN  Endoscopy Procedure Pre Assessment RN

## 2023-08-22 ENCOUNTER — TELEPHONE (OUTPATIENT)
Dept: GASTROENTEROLOGY | Facility: CLINIC | Age: 48
End: 2023-08-22
Payer: COMMERCIAL

## 2023-08-22 NOTE — TELEPHONE ENCOUNTER
Caller: Writer to patient  Reason for Reschedule/Cancellation (please be detailed, any staff messages or encounters to note?): Originally called to add insurance to chart per FC, but patient needed to reschedule anyway.       Prior to reschedule please review:  Ordering Provider: ELIDA DELANEY   Sedation per order: Moderate  Does patient have any ASC Exclusions, please identify?: No      Notes on Cancelled Procedure:  Procedure: Lower Endoscopy [Colonoscopy]   Date: 8/30/2023  Location: [facility]; [address], [place] [address]  Surgeon: Johny      Rescheduled: Yes  Procedure: Lower Endoscopy [Colonoscopy]   Date: 9/12/2023  Location: [facility]; [address], [place] [address]  Surgeon: Jennifer  Sedation Level Scheduled  Moderate,  Reason for Sedation Level Order  Prep/Instructions updated and sent: Jarod

## 2023-08-25 NOTE — TELEPHONE ENCOUNTER
Attempted to contact patient in order to complete pre assessment questions.     No answer. Left message to return call to [phone] option 4      Jayla Caldera RN  Endoscopy Procedure Pre Assessment RN    -----------------------------------------------------------------------    Procedure details:    Patient scheduled for Colonoscopy  on 9/12/23.     Arrival time: 0645. Procedure time 0730    Pre op exam needed? N/A    Facility location: [facility] [place]; [address], [place] [address]    Sedation type: Conscious sedation     See below for additional information.     Lisa Jensen RN   Endoscopy Procedure Pre Assessment RN

## 2023-09-01 NOTE — TELEPHONE ENCOUNTER
Second call attempt to complete pre assessment.     No answer.  Left message to return call to [phone] #4 within 24 hours or risk procedure being cancelled.     Additional information needed?  N/A      Lisa Jensen RN  Endoscopy Procedure Pre Assessment RN

## 2023-09-05 NOTE — TELEPHONE ENCOUNTER
No return call received.   Pre assessment was not completed for upcoming scheduled procedure.     Staff message sent to endoscopy scheduling to cancel procedure per policy.       Jayla Han RN   Endoscopy Procedure Pre Assessment RN

## 2023-09-06 ENCOUNTER — TELEPHONE (OUTPATIENT)
Dept: GASTROENTEROLOGY | Facility: CLINIC | Age: 48
End: 2023-09-06
Payer: COMMERCIAL

## 2023-09-06 NOTE — TELEPHONE ENCOUNTER
Caller: No call made  Reason for Reschedule/Cancellation (please be detailed, any staff messages or encounters to note?): Cancellation policy - pre-assessment call not completed.      Prior to reschedule please review:  Ordering Provider: Ashwin Piper MD   Sedation per order: Moderate  Does patient have any ASC Exclusions, please identify?: No      Notes on Cancelled Procedure:  Procedure: Lower Endoscopy [Colonoscopy]   Date: 9/12/2023  Location: [facility] [place]; [address] [place] [address], [address]nd Floor, [place] [address]  Surgeon: Jennifer      Rescheduled: No, sent MyChart and CTL.

## 2024-02-05 NOTE — CONFIDENTIAL NOTE
Patient recently returned back from [place] and Covid-19 test is negative  Caller says he sons are positive for Covid-19 and one son is on day 6 with continued symptoms and the other son is on day 7 with no symptoms  Caller wants to know how soon can he be in close proximity with sons with his compromised immune system  Triage guidelines recommend to home care  Caller verbalized and understands directives  COVID 19 Nurse Triage Plan/Patient Instructions    Please be aware that novel coronavirus (COVID-19) may be circulating in the community. If you develop symptoms such as fever, cough, or SOB or if you have concerns about the presence of another infection including coronavirus (COVID-19), please contact your health care provider or visit [email or web address].     Disposition/Instructions    Home care recommended. Follow home care protocol based instructions.    Thank you for taking steps to prevent the spread of this virus.  o Limit your contact with others.  o Wear a simple mask to cover your cough.  o Wash your hands well and often.    Resources    [facility]: About COVID-19: [email or web address]    CDC: What to Do If You're Sick: www.cdc.gov/coronavirus/2019-ncov/about/steps-when-sick.html    CDC: Ending Home Isolation: www.cdc.gov/coronavirus/2019-ncov/hcp/disposition-in-home-patients.html     CDC: Caring for Someone: www.cdc.gov/coronavirus/2019-ncov/if-you-are-sick/care-for-someone.html     [facility]: Interim Guidance for Hospital Discharge to Home: www.health.[facility]/diseases/coronavirus/hcp/hospdischarge.pdf    [facility] clinical trials (COVID-19 research studies): clinicalaffairs.[facility]/n-clinical-trials     Below are the COVID-19 hotlines at the [place] Department of Health ([facility]). Interpreters are available.   o For health questions: Call [phone] or [phone] (7 a.m. to 7 p.m.)  o For questions about schools and childcare: Call [phone] or  Spoke to patient, patient will be here 02/06/24 at 1:30.  Message sent to add to schedule.    [phone] (7 a.m. to 7 p.m.)

## 2024-04-06 ENCOUNTER — HEALTH MAINTENANCE LETTER (OUTPATIENT)
Age: 49
End: 2024-04-06

## 2025-02-26 ENCOUNTER — NURSE TRIAGE (OUTPATIENT)
Dept: FAMILY MEDICINE | Facility: CLINIC | Age: 50
End: 2025-02-26
Payer: COMMERCIAL

## 2025-02-26 DIAGNOSIS — U07.1 INFECTION DUE TO 2019 NOVEL CORONAVIRUS: Primary | ICD-10-CM

## 2025-02-26 NOTE — TELEPHONE ENCOUNTER
RN COVID TREATMENT VISIT  02/26/25      The patient has been triaged and does not require a higher level of care.    Devin Patton  49 year old  Current weight? 235    Has the patient been seen by a primary care or specialty provider at a [facility] within the past three years? Yes.   Have you been in close proximity to/do you have a known exposure to a person with a confirmed case of influenza? No.     General treatment eligibility:  Date of positive COVID test (PCR or at home)?  2/26/25    Are you or have you been hospitalized for this COVID-19 infection? No.   Have you received monoclonal antibodies or antiviral treatment for COVID-19 since this positive test? No.   Do you have any of the following conditions that place you at risk of being very sick from COVID-19?   - Mood disorders, including depression and schizophrenia spectrum disorders   - Overweight and Obesity BMI >= 25  Yes, patient has at least one high risk condition as noted above.     Current COVID symptoms:   - fever or chills  - fatigue  - muscle or body aches  Yes. Patient has at least one symptom as selected.     How many days since symptoms started? 5 days or less. Established patient, 12 years or older weighing at least 88.2 lbs, who has symptoms that started in the past 5 days, has not been hospitalized nor received treatment already, and is at risk for being very sick from COVID-19.     Treatment eligibility by RN:  Are you currently pregnant or nursing? No  Do you have a clinically significant hypersensitivity to nirmatrelvir or ritonavir, or toxic epidermal necrolysis (TEN) or Landis-Neel Syndrome? No  Do you have a history of hepatitis, any hepatic impairment on the Problem List (such as Child-Zepeda Class C, cirrhosis, fatty liver disease, alcoholic liver disease), or was the last liver lab (hepatic panel, ALT, AST, ALK Phos, bilirubin) elevated in the past 6 months? No  Do you have any history of severe renal  impairment (eGFR < 30mL/min)? No    Is patient eligible to continue? Yes, patient meets all eligibility requirements for the RN COVID treatment (as denoted by all no responses above).     Current Outpatient Medications   Medication Sig Dispense Refill    EPINEPHrine (ANY BX GENERIC EQUIV) 0.3 MG/0.3ML injection 2-pack Inject 0.3 mg into the muscle (Patient not taking: Reported on 5/15/2023)      famotidine (PEPCID) 20 MG tablet  (Patient not taking: Reported on 6/14/2022)      hydrOXYzine (ATARAX) 50 MG tablet  (Patient not taking: Reported on 6/14/2022)      predniSONE (DELTASONE) 20 MG tablet  (Patient not taking: Reported on 6/14/2022)      triamcinolone (KENALOG) 0.1 % external cream  (Patient not taking: Reported on 6/14/2022)         Medications from List 1 of the standing order (on medications that exclude the use of Paxlovid) that patient is taking: NONE.   Is patient taking any meds from List 1? No.   Medications from List 2 of the standing order (on meds that provider needs to adjust) that patient is taking: NONE. Is patient on any of the meds from List 2? No.   Medications from List 3 of standing order (on meds that a RN needs to adjust) that patient is taking: NONE. Is patient on any meds from List 3? No.     Paxlovid has an approximate 90% reduction in hospitalization. Paxlovid can possibly cause altered sense of taste, diarrhea (loose, watery stools), high blood pressure, muscle aches.     Would patient like a Paxlovid prescription?   Yes.   Lab Results   Component Value Date    GFRESTIMATED >90 05/15/2023       Was last eGFR reduced? No, eGFR 60 or greater/ No Result on record. Patient can receive the normal renal function dose. Paxlovid Rx sent to [place] [facility]   [facility]    Temporary change to home medications: None    All medication adjustments (holds, etc) were discussed with the patient and patient was asked to repeat back (teachback) their med adjustment.  Did patient understand  med adjustment? No medication adjustments needed.         Reviewed the following instructions with the patient:    Paxlovid (nimatrelvir and ritonavir)    How it works  Two medicines (nirmatrelvir and ritonavir) are taken together. They stop the virus from growing. Less amount of virus is easier for your body to fight.    How to take  Medicine comes in a daily container with both medicine tablets. Take by mouth twice daily (once in the morning, once at night) for 5 days.  The number of tablets to take varies by patient.  Don't chew or break capsules. Swallow whole.    When to take  Take as soon as possible after positive COVID-19 test result, and within 5 days of your first symptoms.    Possible side effects  Can cause altered sense of taste, diarrhea (loose, watery stools), high blood pressure, muscle aches.    Shyla Singh RN       Reason for Disposition   HIGH RISK patient (e.g., weak immune system, age > 64 years, obesity with BMI of 30 or higher, pregnant, chronic lung disease) and COVID symptoms (e.g., cough, fever) (Exceptions: Already seen by doctor or NP/PA and no new or worsening symptoms.)    Additional Information   Negative: SEVERE difficulty breathing (e.g., struggling for each breath, speaks in single words)   Negative: Difficult to awaken or acting confused (e.g., disoriented, slurred speech)   Negative: Bluish (or gray) lips or face now   Negative: Shock suspected (e.g., cold/pale/clammy skin, too weak to stand, low BP, rapid pulse)   Negative: Sounds like a life-threatening emergency to the triager   Negative: SEVERE or constant chest pain or pressure  (Exception: Mild central chest pain, present only when coughing.)   Negative: MODERATE difficulty breathing (e.g., speaks in phrases, SOB even at rest, pulse 100-120)   Negative: Headache and stiff neck (can't touch chin to chest)   Negative: Oxygen level (e.g., pulse oximetry) 90% or lower   Negative: Chest pain or pressure  (Exception: MILD  "central chest pain, present only when coughing.)   Negative: Drinking very little and dehydration suspected (e.g., no urine > 12 hours, very dry mouth, very lightheaded)   Negative: Patient sounds very sick or weak to the triager   Negative: MILD difficulty breathing (e.g., minimal/no SOB at rest, SOB with walking, pulse <100)   Negative: Fever > 103 F (39.4 C)   Negative: Fever > 101 F (38.3 C) and over 60 years of age   Negative: Fever > 100 F (37.8 C) and bedridden (e.g., CVA, chronic illness, recovering from surgery)    Answer Assessment - Initial Assessment Questions  1. SYMPTOMS: \"What is your main symptom or concern?\" (e.g., cough, fever, shortness of breath, muscle aches)      Started with sinus drainage, body aches, fatigue, fever- 101.5 broke with ibuprofen    2. ONSET: \"When did the symptoms start?\"       Yesterday    3. COUGH: \"Do you have a cough?\" If Yes, ask: \"How bad is the cough?\"        No    4. FEVER: \"Do you have a fever?\" If Yes, ask: \"What is your temperature, how was it measured, and when did it start?\"      Had last night 101.5    5. BREATHING DIFFICULTY: \"Are you having any difficulty breathing?\" (e.g., normal; shortness of breath, wheezing, unable to speak)       No    6. BETTER-SAME-WORSE: \"Are you getting better, staying the same or getting worse compared to yesterday?\"  If getting worse, ask, \"In what way?\"      Getting worse, symptoms started out as runny nose and got worse over night    7. OTHER SYMPTOMS: \"Do you have any other symptoms?\"  (e.g., chills, fatigue, headache, loss of smell or taste, muscle pain, sore throat)      No    8. COVID-19 DIAGNOSIS: \"How do you know that you have COVID?\" (e.g., positive lab test or self-test, diagnosed by doctor or NP/PA, symptoms after exposure).      Positive test this morning    9. COVID-19 EXPOSURE: \"Was there any known exposure to COVID before the symptoms began?\"       Spouse tested positive yesterday    10. COVID-19 VACCINE: \"Have you had " "the COVID-19 vaccine?\" If Yes, ask: \"When did you last get it?\"        Has gotten it previously- did not do this year    11. HIGH RISK DISEASE: \"Do you have any chronic medical problems?\" (e.g., asthma, heart or lung disease, weak immune system, obesity, etc.)        Mood disorder    12. PREGNANCY: \"Is there any chance you are pregnant?\" \"When was your last menstrual period?\"        Not applicable    13. O2 SATURATION MONITOR:  \"Do you use an oxygen saturation monitor (pulse oximeter) at home?\" If Yes, ask \"What is your reading (oxygen level) today?\" \"What is your usual oxygen saturation reading?\" (e.g., 95%)        no    Protocols used: COVID-19 - Diagnosed or [id number]    "

## 2025-04-13 ENCOUNTER — HEALTH MAINTENANCE LETTER (OUTPATIENT)
Age: 50
End: 2025-04-13

## 2025-04-30 ENCOUNTER — PATIENT OUTREACH (OUTPATIENT)
Dept: GASTROENTEROLOGY | Facility: CLINIC | Age: 50
End: 2025-04-30
Payer: COMMERCIAL

## 2025-04-30 ENCOUNTER — TELEPHONE (OUTPATIENT)
Dept: GASTROENTEROLOGY | Facility: CLINIC | Age: 50
End: 2025-04-30
Payer: COMMERCIAL

## 2025-04-30 DIAGNOSIS — Z12.11 SPECIAL SCREENING FOR MALIGNANT NEOPLASMS, COLON: Primary | ICD-10-CM

## 2025-04-30 NOTE — TELEPHONE ENCOUNTER
Pre visit planning completed.      Procedure details:    Patient scheduled for Colonoscopy on 5/16/25.     Approximate arrival time: 1225. Procedure time 1310.   *Ensure patient is aware that endoscopy team will be calling about 2 days prior to procedure date to confirm arrival time as this may change.     Facility location: [facility]; [address], [place] [address]. Check in location: 2nd Floor at Surgery desk.  *Disclaimer: Drivers are to check in with patient and stay on campus during procedure.     Sedation type: Conscious sedation     Pre op exam needed? No.    Indication for procedure: screening       Chart review:     Electronic implanted devices? No    Recent diagnosis of diverticulitis within the last 6 weeks? No      Medication review:    Diabetic? No    Anticoagulants? No    Weight loss medication/injectable? No GLP-1 medication per patient's medication list. Nursing to verify with pre-assessment call.    Other medication HOLDING recommendations:  N/A      Prep for procedure:     Bowel prep recommendation: Standard Miralax.   Due to: standard bowel prep    Procedure information and instructions sent via [employer]         Emily Weber RN  Endoscopy Procedure Pre Assessment   [phone] option 3

## 2025-04-30 NOTE — PROGRESS NOTES
Pt request for colonoscopy from scheduling. Appears to be first colonoscopy.    CRC Screening Colonoscopy Referral Review    Patient meets the inclusion criteria for screening colonoscopy standing order.    Ordering/Referring Provider:  Ashwin Piper      BMI: Estimated body mass index is 33.2 kg/m  as calculated from the following:    Height as of 1/11/22: 1.829 m (6').    Weight as of 5/15/23: 111 kg (244 lb 12.8 oz).     Sedation:  Does patient have any of the following conditions affecting sedation?  No medical conditions affecting sedation.    Previous Scopes:  Any previous recommendations or follow up needs based on previous scope?  na / No recommendations.    Medical Concerns to Postpone Order:  Does patient have any of the following medical concerns that should postpone/delay colonoscopy referral?  No medical conditions affecting colonoscopy referral.    Final Referral Details:  Based on patient's medical history patient is appropriate for referral order with moderate sedation. If patient's BMI > 50 do not schedule in ASC.

## 2025-04-30 NOTE — TELEPHONE ENCOUNTER
"Endoscopy Scheduling Screen    Caller: patient    Have you had any respiratory illness or flu-like symptoms in the last 10 days?  No    What is your communication preference for Instructions and/or Bowel Prep?   MyChart    What insurance is in the chart?  Other:      Ordering/Referring Provider:     ELIDA DELANEY      (If ordering provider performs procedure, schedule with ordering provider unless otherwise instructed. )    BMI: Estimated body mass index is 33.2 kg/m  as calculated from the following:    Height as of 1/11/22: 1.829 m (6').    Weight as of 5/15/23: 111 kg (244 lb 12.8 oz).     Sedation Ordered  moderate sedation.   If patient BMI > 50 do not schedule in ASC.    If patient BMI > 45 do not schedule at [facility].    Are you taking methadone or Suboxone?  NO, No RN review required.    Have you been diagnosed and are being treated for severe PTSD or severe anxiety?  NO, No RN review required.    Are you taking any prescription medications for pain 3 or more times per week?   NO, No RN review required.    Do you have a history of malignant hyperthermia?  No    (Females) Are you currently pregnant?        Have you been diagnosed or told you have pulmonary hypertension?   No    Do you have an LVAD?  No    Have you been told you have moderate to severe sleep apnea?  No.    Have you been told you have COPD, asthma, or any other lung disease?  No    Has your doctor ordered any cardiac tests like echo, angiogram, stress test, ablation, or EKG, that you have not completed yet?  No    Do you  have a history of any heart conditions?  No     Have you ever had or are you waiting for an organ transplant?  No. Continue scheduling, no site restrictions.    Have you had a stroke or transient ischemic attack (TIA aka \"mini stroke\") in the last 2 years?   No.    Have you been diagnosed with or been told you have cirrhosis of the liver?   No.    Are you currently on dialysis?   No    Do you need assistance " transferring?   No    BMI: Estimated body mass index is 33.2 kg/m  as calculated from the following:    Height as of 1/11/22: 1.829 m (6').    Weight as of 5/15/23: 111 kg (244 lb 12.8 oz).     Is patients BMI > 40 and scheduling location [facility]?  No    Do you take an injectable or oral medication for weight loss or diabetes (excluding insulin)?  No    Do you take the medication Naltrexone?  No    Do you take blood thinners?  No       Prep   Are you currently on dialysis or do you have chronic kidney disease?  No    Do you have a diagnosis of diabetes?  No    Do you have a diagnosis of cystic fibrosis (CF)?  No    On a regular basis do you go 3 -5 days between bowel movements?  No    BMI > 40?  No    Preferred Pharmacy:    [facility] [address] [place] [address] [facility] [address]  Phone: [phone] Fax: [phone]      Final Scheduling Details     Procedure scheduled  Colonoscopy    Surgeon:  Micah     Date of procedure:  5/16     Pre-OP / PAC:   No - Not required for this site.    Location  [facility] - ASC - Patient preference.    Sedation   Moderate Sedation - Per order.      Patient Reminders:   You will receive a call from a Nurse to review instructions and health history.  This assessment must be completed prior to your procedure.  Failure to complete the Nurse assessment may result in the procedure being cancelled.      On the day of your procedure, please designate an adult(s) who can drive you home stay with you for the next 24 hours. The medicines used in the exam will make you sleepy. You will not be able to drive.      You cannot take public transportation, ride share services, or non-medical taxi service without a responsible caregiver.  Medical transport services are allowed with the requirement that a responsible caregiver will receive you at your destination.  We require that drivers and caregivers are confirmed prior to your procedure.

## 2025-05-01 NOTE — TELEPHONE ENCOUNTER
Pre assessment completed for upcoming procedure.   (Please see previous telephone encounter notes for complete details)    I called and spoke with patient      Procedure details:    Approximate time and facility location reviewed.   Patient is aware that endoscopy team will be calling about 2 days prior to confirm arrival time.    Designated  policy reviewed and that site requests drivers to check in and stay on campus. Instructed to have someone stay 6  hours post procedure.   *Disclaimer - please notify the [facility] RN GI staff with any  issues/concerns.    Medication review:    Medications reviewed. Please see supporting documentation below. Holding recommendations discussed (if applicable).       Prep for procedure:     Procedure prep instructions reviewed.        Any additional information needed:  N/A      Patient verbalized understanding and had no questions or concerns at this time.      Anupama Brasher LPN  Endoscopy Procedure Pre Assessment   [phone] option 3

## 2025-05-14 ENCOUNTER — TELEPHONE (OUTPATIENT)
Dept: GASTROENTEROLOGY | Facility: CLINIC | Age: 50
End: 2025-05-14
Payer: COMMERCIAL

## 2025-05-16 ENCOUNTER — HOSPITAL ENCOUNTER (OUTPATIENT)
Facility: AMBULATORY SURGERY CENTER | Age: 50
Discharge: HOME | End: 2025-05-16
Attending: COLON & RECTAL SURGERY | Admitting: COLON & RECTAL SURGERY
Payer: COMMERCIAL

## 2025-05-16 VITALS
OXYGEN SATURATION: 95 % | DIASTOLIC BLOOD PRESSURE: 72 MMHG | SYSTOLIC BLOOD PRESSURE: 122 MMHG | RESPIRATION RATE: 18 BRPM | TEMPERATURE: 97.9 F | HEART RATE: 53 BPM

## 2025-05-16 LAB — COLONOSCOPY: NORMAL

## 2025-05-16 PROCEDURE — 45380 COLONOSCOPY AND BIOPSY: CPT

## 2025-05-16 PROCEDURE — G8907 PT DOC NO EVENTS ON DISCHARG: HCPCS

## 2025-05-16 PROCEDURE — G8918 PT W/O PREOP ORDER IV AB PRO: HCPCS

## 2025-05-16 RX ORDER — FLUMAZENIL 0.1 MG/ML
0.2 INJECTION, SOLUTION INTRAVENOUS
Status: CANCELLED | OUTPATIENT
Start: 2025-05-16 | End: 2025-05-16

## 2025-05-16 RX ORDER — NALOXONE HYDROCHLORIDE 0.4 MG/ML
0.4 INJECTION, SOLUTION INTRAMUSCULAR; INTRAVENOUS; SUBCUTANEOUS
Status: CANCELLED | OUTPATIENT
Start: 2025-05-16

## 2025-05-16 RX ORDER — NALOXONE HYDROCHLORIDE 0.4 MG/ML
0.2 INJECTION, SOLUTION INTRAMUSCULAR; INTRAVENOUS; SUBCUTANEOUS
Status: CANCELLED | OUTPATIENT
Start: 2025-05-16

## 2025-05-16 RX ORDER — ONDANSETRON 4 MG/1
4 TABLET, ORALLY DISINTEGRATING ORAL EVERY 6 HOURS PRN
Status: CANCELLED | OUTPATIENT
Start: 2025-05-16

## 2025-05-16 RX ORDER — PROCHLORPERAZINE MALEATE 10 MG
10 TABLET ORAL EVERY 6 HOURS PRN
Status: CANCELLED | OUTPATIENT
Start: 2025-05-16

## 2025-05-16 RX ORDER — FENTANYL CITRATE 50 UG/ML
INJECTION, SOLUTION INTRAMUSCULAR; INTRAVENOUS PRN
Status: DISCONTINUED | OUTPATIENT
Start: 2025-05-16 | End: 2025-05-16 | Stop reason: HOSPADM

## 2025-05-16 RX ORDER — ONDANSETRON 2 MG/ML
4 INJECTION INTRAMUSCULAR; INTRAVENOUS
Status: DISCONTINUED | OUTPATIENT
Start: 2025-05-16 | End: 2025-05-17 | Stop reason: HOSPADM

## 2025-05-16 RX ORDER — ONDANSETRON 2 MG/ML
4 INJECTION INTRAMUSCULAR; INTRAVENOUS EVERY 6 HOURS PRN
Status: CANCELLED | OUTPATIENT
Start: 2025-05-16

## 2025-05-16 RX ORDER — LIDOCAINE 40 MG/G
CREAM TOPICAL
Status: DISCONTINUED | OUTPATIENT
Start: 2025-05-16 | End: 2025-05-17 | Stop reason: HOSPADM

## 2025-05-16 NOTE — H&P
Pre-Endoscopy History and Physical     Devin Patton MRN# [id number]   YOB: 1975 Age: 49 year old     Date of Procedure: 5/16/2025  Primary care provider: Ashwin Piper  Type of Endoscopy: colonoscopy  Reason for Procedure:Colonoscopy with possible biopsy, possible polypectomy  Type of Anesthesia Anticipated: Moderate (conscious) sedation    HPI:    Devin is a 49 year old male who will be undergoing the above procedure.      A history and physical has been performed. The patient's medications and allergies have been reviewed. The risks and benefits of the procedure and the sedation options and risks were discussed with the patient.  All questions were answered and informed consent was obtained.      He denies a personal or family history of anesthesia complications or bleeding disorders.     Allergies   Allergen Reactions    Benadryl Allergy Dermatitis, Dizziness, Hives, Itching and Swelling    Diphenhydramine-Zinc Acetate Hives    Sertraline         Current Outpatient Medications   Medication Sig Dispense Refill    EPINEPHrine (ANY BX GENERIC EQUIV) 0.3 MG/0.3ML injection 2-pack Inject 0.3 mg into the muscle (Patient not taking: Reported on 5/15/2023)      famotidine (PEPCID) 20 MG tablet  (Patient not taking: Reported on 6/14/2022)      hydrOXYzine (ATARAX) 50 MG tablet  (Patient not taking: Reported on 6/14/2022)      predniSONE (DELTASONE) 20 MG tablet  (Patient not taking: Reported on 6/14/2022)      triamcinolone (KENALOG) 0.1 % external cream  (Patient not taking: Reported on 6/14/2022)       Current Facility-Administered Medications   Medication Dose Route Frequency Provider Last Rate Last Admin    lidocaine (LMX4) kit   Topical Q1H PRN Annmarie Obrien MD        lidocaine 1 % 0.1-1 mL  0.1-1 mL Other Q1H PRN Annmarie Obrien MD        ondansetron (ZOFRAN) injection 4 mg  4 mg Intravenous Once PRN Annmarie Obrien MD        sodium chloride (PF) 0.9% PF flush 3  "mL  3 mL Intracatheter Q8H ZACK Annmarie Obrien MD        sodium chloride (PF) 0.9% PF flush 3 mL  3 mL Intracatheter q1 min prn Annmarie Obrien MD           Patient Active Problem List   Diagnosis    Adjustment disorder with mixed anxiety and depressed mood    CARDIOVASCULAR SCREENING; LDL GOAL LESS THAN 160    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)    Alcohol dependence in remission (H)    Anxiety about health    Panic disorder    Major depressive disorder, recurrent episode, mild        Past Medical History:   Diagnosis Date    Alcohol abuse, in remission     Sober since 12/03    GIST (gastrointestinal stromal tumor), malignant (H) 9/30/2015    Lumbago      hx \"disc herniation lumbar with sciatica sx occ RLE\" per pt    Other anxiety states     hx panic attacks    Tobacco use disorder         Past Surgical History:   Procedure Laterality Date    ZZC NONSPECIFIC PROCEDURE      wisdome teeth extracted       Social History     Tobacco Use    Smoking status: Never    Smokeless tobacco: Former     Types: Chew   Substance Use Topics    Alcohol use: No     Alcohol/week: 0.0 standard drinks of alcohol     Comment: alcohol abuse        Family History   Problem Relation Age of Onset    Diabetes Maternal Grandfather     Family History Negative Father     Blood Disease Mother         polycythemia vera       REVIEW OF SYSTEMS:     5 point ROS negative except as noted above in HPI, including Gen., Resp., CV, GI &  system review.      PHYSICAL EXAM:   /68   Pulse 57   Temp 97.6  F (36.4  C) (Temporal)   Resp 16   SpO2 97%  Estimated body mass index is 33.2 kg/m  as calculated from the following:    Height as of 1/11/22: 1.829 m (6').    Weight as of 5/15/23: 111 kg (244 lb 12.8 oz).   GENERAL APPEARANCE: healthy  MENTAL STATUS: alert and oriented x 3  AIRWAY EXAM: Mallampatti Class I (visualization of the soft palate, fauces, uvula, anterior and posterior pillars)  RESP: lungs " clear to auscultation - no rales, rhonchi or wheezes  CV: regular rates and rhythm and normal S1 S2, no S3 or S4      DIAGNOSTICS:    Not indicated      IMPRESSION   ASA Class 2 - Mild systemic disease        PLAN:   Colonoscopy with possible biopsy, possible polypectomy.    The above has been forwarded to the consulting provider.      Signed Electronically by: Annmarie Obrien MD  May 16, 2025    .

## 2025-05-21 LAB
PATH REPORT.COMMENTS IMP SPEC: NORMAL
PATH REPORT.COMMENTS IMP SPEC: NORMAL
PATH REPORT.FINAL DX SPEC: NORMAL
PATH REPORT.GROSS SPEC: NORMAL
PATH REPORT.MICROSCOPIC SPEC OTHER STN: NORMAL
PATH REPORT.RELEVANT HX SPEC: NORMAL
PHOTO IMAGE: NORMAL

## 2025-06-02 ENCOUNTER — RESULTS FOLLOW-UP (OUTPATIENT)
Dept: GASTROENTEROLOGY | Facility: CLINIC | Age: 50
End: 2025-06-02

## (undated) DEVICE — PAD CHUX UNDERPAD 23X24" 7136

## (undated) DEVICE — PREP CHLORAPREP 26ML TINTED ORANGE  260815

## (undated) DEVICE — LIFTER SURGICAL ASCENDO SUBMUCOSAL LIFT AGENT BX00712934

## (undated) DEVICE — KIT ENDO FIRST STEP DISINFECTANT 200ML W/POUCH EP-4

## (undated) RX ORDER — FENTANYL CITRATE 50 UG/ML
INJECTION, SOLUTION INTRAMUSCULAR; INTRAVENOUS
Status: DISPENSED
Start: 2019-04-18

## (undated) RX ORDER — FENTANYL CITRATE 50 UG/ML
INJECTION, SOLUTION INTRAMUSCULAR; INTRAVENOUS
Status: DISPENSED
Start: 2025-05-16